# Patient Record
Sex: MALE | Race: OTHER | URBAN - METROPOLITAN AREA
[De-identification: names, ages, dates, MRNs, and addresses within clinical notes are randomized per-mention and may not be internally consistent; named-entity substitution may affect disease eponyms.]

---

## 2018-02-03 ENCOUNTER — INPATIENT (INPATIENT)
Facility: HOSPITAL | Age: 43
LOS: 5 days | Discharge: HOME | DRG: 897 | End: 2018-02-09
Attending: STUDENT IN AN ORGANIZED HEALTH CARE EDUCATION/TRAINING PROGRAM | Admitting: HOSPITALIST
Payer: MEDICAID

## 2018-02-03 VITALS
SYSTOLIC BLOOD PRESSURE: 177 MMHG | HEART RATE: 115 BPM | TEMPERATURE: 99 F | OXYGEN SATURATION: 96 % | RESPIRATION RATE: 18 BRPM | DIASTOLIC BLOOD PRESSURE: 132 MMHG

## 2018-02-03 LAB
ALBUMIN SERPL ELPH-MCNC: 4.6 G/DL — SIGNIFICANT CHANGE UP (ref 3.3–5)
ALP SERPL-CCNC: 74 U/L — SIGNIFICANT CHANGE UP (ref 40–120)
ALT FLD-CCNC: 48 U/L RC — HIGH (ref 10–45)
ANION GAP SERPL CALC-SCNC: 20 MMOL/L — HIGH (ref 5–17)
AST SERPL-CCNC: 49 U/L — HIGH (ref 10–40)
BILIRUB SERPL-MCNC: 0.7 MG/DL — SIGNIFICANT CHANGE UP (ref 0.2–1.2)
BUN SERPL-MCNC: 6 MG/DL — LOW (ref 7–23)
CALCIUM SERPL-MCNC: 9 MG/DL — SIGNIFICANT CHANGE UP (ref 8.4–10.5)
CHLORIDE SERPL-SCNC: 102 MMOL/L — SIGNIFICANT CHANGE UP (ref 96–108)
CO2 SERPL-SCNC: 25 MMOL/L — SIGNIFICANT CHANGE UP (ref 22–31)
CREAT SERPL-MCNC: 0.98 MG/DL — SIGNIFICANT CHANGE UP (ref 0.5–1.3)
EOSINOPHIL # BLD AUTO: 0 K/UL — SIGNIFICANT CHANGE UP (ref 0–0.5)
EOSINOPHIL NFR BLD AUTO: 0.6 % — SIGNIFICANT CHANGE UP (ref 0–6)
ETHANOL SERPL-MCNC: 370 MG/DL — HIGH (ref 0–10)
GLUCOSE SERPL-MCNC: 124 MG/DL — HIGH (ref 70–99)
HCT VFR BLD CALC: 45.7 % — SIGNIFICANT CHANGE UP (ref 39–50)
HGB BLD-MCNC: 16.1 G/DL — SIGNIFICANT CHANGE UP (ref 13–17)
LYMPHOCYTES # BLD AUTO: 2.6 K/UL — SIGNIFICANT CHANGE UP (ref 1–3.3)
LYMPHOCYTES # BLD AUTO: 61.5 % — HIGH (ref 13–44)
MAGNESIUM SERPL-MCNC: 2.2 MG/DL — SIGNIFICANT CHANGE UP (ref 1.6–2.6)
MCHC RBC-ENTMCNC: 34.2 PG — HIGH (ref 27–34)
MCHC RBC-ENTMCNC: 35.2 GM/DL — SIGNIFICANT CHANGE UP (ref 32–36)
MCV RBC AUTO: 97.2 FL — SIGNIFICANT CHANGE UP (ref 80–100)
MONOCYTES # BLD AUTO: 0.3 K/UL — SIGNIFICANT CHANGE UP (ref 0–0.9)
MONOCYTES NFR BLD AUTO: 7.9 % — SIGNIFICANT CHANGE UP (ref 2–14)
NEUTROPHILS # BLD AUTO: 1.1 K/UL — LOW (ref 1.8–7.4)
NEUTROPHILS NFR BLD AUTO: 26.1 % — LOW (ref 43–77)
PHOSPHATE SERPL-MCNC: 3.2 MG/DL — SIGNIFICANT CHANGE UP (ref 2.5–4.5)
PLAT MORPH BLD: NORMAL — SIGNIFICANT CHANGE UP
PLATELET # BLD AUTO: 272 K/UL — SIGNIFICANT CHANGE UP (ref 150–400)
POTASSIUM SERPL-MCNC: 3.9 MMOL/L — SIGNIFICANT CHANGE UP (ref 3.5–5.3)
POTASSIUM SERPL-SCNC: 3.9 MMOL/L — SIGNIFICANT CHANGE UP (ref 3.5–5.3)
PROT SERPL-MCNC: 7.6 G/DL — SIGNIFICANT CHANGE UP (ref 6–8.3)
RBC # BLD: 4.7 M/UL — SIGNIFICANT CHANGE UP (ref 4.2–5.8)
RBC # FLD: 14.1 % — SIGNIFICANT CHANGE UP (ref 10.3–14.5)
RBC BLD AUTO: NORMAL — SIGNIFICANT CHANGE UP
SODIUM SERPL-SCNC: 147 MMOL/L — HIGH (ref 135–145)
VARIANT LYMPHS # BLD: 4 % — SIGNIFICANT CHANGE UP (ref 0–6)
WBC # BLD: 4.3 K/UL — SIGNIFICANT CHANGE UP (ref 3.8–10.5)
WBC # FLD AUTO: 4.3 K/UL — SIGNIFICANT CHANGE UP (ref 3.8–10.5)

## 2018-02-03 PROCEDURE — 93010 ELECTROCARDIOGRAM REPORT: CPT

## 2018-02-03 PROCEDURE — 99285 EMERGENCY DEPT VISIT HI MDM: CPT | Mod: 25

## 2018-02-03 PROCEDURE — 70450 CT HEAD/BRAIN W/O DYE: CPT | Mod: 26

## 2018-02-03 RX ORDER — THIAMINE MONONITRATE (VIT B1) 100 MG
500 TABLET ORAL ONCE
Qty: 0 | Refills: 0 | Status: COMPLETED | OUTPATIENT
Start: 2018-02-03 | End: 2018-02-03

## 2018-02-03 RX ORDER — SODIUM CHLORIDE 9 MG/ML
1000 INJECTION INTRAMUSCULAR; INTRAVENOUS; SUBCUTANEOUS ONCE
Qty: 0 | Refills: 0 | Status: COMPLETED | OUTPATIENT
Start: 2018-02-03 | End: 2018-02-03

## 2018-02-03 RX ORDER — THIAMINE MONONITRATE (VIT B1) 100 MG
100 TABLET ORAL ONCE
Qty: 0 | Refills: 0 | Status: DISCONTINUED | OUTPATIENT
Start: 2018-02-03 | End: 2018-02-03

## 2018-02-03 RX ADMIN — SODIUM CHLORIDE 1000 MILLILITER(S): 9 INJECTION INTRAMUSCULAR; INTRAVENOUS; SUBCUTANEOUS at 23:48

## 2018-02-03 RX ADMIN — Medication 2 MILLIGRAM(S): at 22:20

## 2018-02-03 NOTE — ED PROVIDER NOTE - OBJECTIVE STATEMENT
Coming in for detox, wants to Coming in for detox, wants to stop using alcohol. Per brother has been acting unusually lately, may have fallen and bumped his head recently, is losing time and more confused than usual.  Patient has no complaints, no headache, neck pain, chest pain.  Had multiple episodes of vomiting today.  no fever/cough Coming in for detox, wants to stop using alcohol. Per brother has been acting unusually lately, may have fallen and bumped his head recently, is losing time and more confused than usual.  Patient has no complaints, no headache, neck pain, chest pain.  Had multiple episodes of vomiting today.  no fever/cough. hx DTs, no seizures

## 2018-02-03 NOTE — ED ADULT NURSE NOTE - OBJECTIVE STATEMENT
Presents requesting detox from alcohol. Pt states last drink was 4 shots of vodka this morning, Pt reports drinking an unspecified amount of vodka and beer daily. Pt states that he drinks "a lot." Unable to provide approximate amount of alcohol intake daily. Denies si/hi. On arrival to ED, pt anxious and forgetful. Reports that he fell into the wall today and hit his head. Denies LOC. Pt A&Ox3 with periods of forgetfulness. Denies cp/sob. Respirations even/unlabored. Abd soft. No n/v/d at present. Denies urinary symptoms. Skin WDI. Pt's brother at bedside and supportive with care.

## 2018-02-03 NOTE — ED PROVIDER NOTE - ATTENDING CONTRIBUTION TO CARE
pt is a 41 y/o male with h/o etoh abuse, dt, anxiety presents with request for detox brought in by his brother, found to have tremors, st 115, ivf, ativan, ciwa protocol, labs likely admisison.

## 2018-02-04 DIAGNOSIS — D70.9 NEUTROPENIA, UNSPECIFIED: ICD-10-CM

## 2018-02-04 DIAGNOSIS — R74.0 NONSPECIFIC ELEVATION OF LEVELS OF TRANSAMINASE AND LACTIC ACID DEHYDROGENASE [LDH]: ICD-10-CM

## 2018-02-04 DIAGNOSIS — E87.0 HYPEROSMOLALITY AND HYPERNATREMIA: ICD-10-CM

## 2018-02-04 DIAGNOSIS — M67.412 GANGLION, LEFT SHOULDER: Chronic | ICD-10-CM

## 2018-02-04 DIAGNOSIS — F32.9 MAJOR DEPRESSIVE DISORDER, SINGLE EPISODE, UNSPECIFIED: ICD-10-CM

## 2018-02-04 DIAGNOSIS — Z29.9 ENCOUNTER FOR PROPHYLACTIC MEASURES, UNSPECIFIED: ICD-10-CM

## 2018-02-04 DIAGNOSIS — F10.10 ALCOHOL ABUSE, UNCOMPLICATED: ICD-10-CM

## 2018-02-04 DIAGNOSIS — E87.2 ACIDOSIS: ICD-10-CM

## 2018-02-04 LAB
ALBUMIN SERPL ELPH-MCNC: 3.7 G/DL — SIGNIFICANT CHANGE UP (ref 3.3–5)
ALP SERPL-CCNC: 62 U/L — SIGNIFICANT CHANGE UP (ref 40–120)
ALT FLD-CCNC: 39 U/L — SIGNIFICANT CHANGE UP (ref 10–45)
ANION GAP SERPL CALC-SCNC: 15 MMOL/L — SIGNIFICANT CHANGE UP (ref 5–17)
APPEARANCE UR: CLEAR — SIGNIFICANT CHANGE UP
AST SERPL-CCNC: 36 U/L — SIGNIFICANT CHANGE UP (ref 10–40)
BILIRUB DIRECT SERPL-MCNC: 0.2 MG/DL — SIGNIFICANT CHANGE UP (ref 0–0.2)
BILIRUB INDIRECT FLD-MCNC: 0.6 MG/DL — SIGNIFICANT CHANGE UP (ref 0.2–1)
BILIRUB SERPL-MCNC: 0.8 MG/DL — SIGNIFICANT CHANGE UP (ref 0.2–1.2)
BILIRUB UR-MCNC: NEGATIVE — SIGNIFICANT CHANGE UP
BUN SERPL-MCNC: 7 MG/DL — SIGNIFICANT CHANGE UP (ref 7–23)
CALCIUM SERPL-MCNC: 8.2 MG/DL — LOW (ref 8.4–10.5)
CHLORIDE SERPL-SCNC: 105 MMOL/L — SIGNIFICANT CHANGE UP (ref 96–108)
CO2 SERPL-SCNC: 23 MMOL/L — SIGNIFICANT CHANGE UP (ref 22–31)
COLOR SPEC: YELLOW — SIGNIFICANT CHANGE UP
CREAT SERPL-MCNC: 0.8 MG/DL — SIGNIFICANT CHANGE UP (ref 0.5–1.3)
DIFF PNL FLD: NEGATIVE — SIGNIFICANT CHANGE UP
GLUCOSE SERPL-MCNC: 104 MG/DL — HIGH (ref 70–99)
GLUCOSE UR QL: NEGATIVE MG/DL — SIGNIFICANT CHANGE UP
KETONES UR-MCNC: NEGATIVE — SIGNIFICANT CHANGE UP
LEUKOCYTE ESTERASE UR-ACNC: NEGATIVE — SIGNIFICANT CHANGE UP
MAGNESIUM SERPL-MCNC: 1.9 MG/DL — SIGNIFICANT CHANGE UP (ref 1.6–2.6)
NITRITE UR-MCNC: NEGATIVE — SIGNIFICANT CHANGE UP
PH UR: 8 — SIGNIFICANT CHANGE UP (ref 5–8)
PHOSPHATE SERPL-MCNC: 3.9 MG/DL — SIGNIFICANT CHANGE UP (ref 2.5–4.5)
POTASSIUM SERPL-MCNC: 3.8 MMOL/L — SIGNIFICANT CHANGE UP (ref 3.5–5.3)
POTASSIUM SERPL-SCNC: 3.8 MMOL/L — SIGNIFICANT CHANGE UP (ref 3.5–5.3)
PROT SERPL-MCNC: 6.1 G/DL — SIGNIFICANT CHANGE UP (ref 6–8.3)
PROT UR-MCNC: NEGATIVE MG/DL — SIGNIFICANT CHANGE UP
SODIUM SERPL-SCNC: 143 MMOL/L — SIGNIFICANT CHANGE UP (ref 135–145)
SP GR SPEC: 1.01 — SIGNIFICANT CHANGE UP (ref 1.01–1.02)
UROBILINOGEN FLD QL: 1 MG/DL — SIGNIFICANT CHANGE UP

## 2018-02-04 PROCEDURE — 99223 1ST HOSP IP/OBS HIGH 75: CPT | Mod: GC

## 2018-02-04 PROCEDURE — 12345: CPT | Mod: GC,NC

## 2018-02-04 PROCEDURE — 71045 X-RAY EXAM CHEST 1 VIEW: CPT | Mod: 26

## 2018-02-04 RX ORDER — LOSARTAN POTASSIUM 100 MG/1
100 TABLET, FILM COATED ORAL DAILY
Qty: 0 | Refills: 0 | Status: DISCONTINUED | OUTPATIENT
Start: 2018-02-04 | End: 2018-02-09

## 2018-02-04 RX ORDER — FOLIC ACID 0.8 MG
1 TABLET ORAL DAILY
Qty: 0 | Refills: 0 | Status: DISCONTINUED | OUTPATIENT
Start: 2018-02-04 | End: 2018-02-09

## 2018-02-04 RX ORDER — INFLUENZA VIRUS VACCINE 15; 15; 15; 15 UG/.5ML; UG/.5ML; UG/.5ML; UG/.5ML
0.5 SUSPENSION INTRAMUSCULAR ONCE
Qty: 0 | Refills: 0 | Status: COMPLETED | OUTPATIENT
Start: 2018-02-04 | End: 2018-02-09

## 2018-02-04 RX ORDER — ACETAMINOPHEN 500 MG
650 TABLET ORAL ONCE
Qty: 0 | Refills: 0 | Status: COMPLETED | OUTPATIENT
Start: 2018-02-04 | End: 2018-02-04

## 2018-02-04 RX ORDER — ALBUTEROL 90 UG/1
2 AEROSOL, METERED ORAL EVERY 6 HOURS
Qty: 0 | Refills: 0 | Status: DISCONTINUED | OUTPATIENT
Start: 2018-02-04 | End: 2018-02-09

## 2018-02-04 RX ORDER — HYDRALAZINE HCL 50 MG
25 TABLET ORAL ONCE
Qty: 0 | Refills: 0 | Status: COMPLETED | OUTPATIENT
Start: 2018-02-04 | End: 2018-02-04

## 2018-02-04 RX ORDER — THIAMINE MONONITRATE (VIT B1) 100 MG
100 TABLET ORAL DAILY
Qty: 0 | Refills: 0 | Status: COMPLETED | OUTPATIENT
Start: 2018-02-04 | End: 2018-02-06

## 2018-02-04 RX ORDER — HYDRALAZINE HCL 50 MG
10 TABLET ORAL ONCE
Qty: 0 | Refills: 0 | Status: COMPLETED | OUTPATIENT
Start: 2018-02-04 | End: 2018-02-04

## 2018-02-04 RX ORDER — LABETALOL HCL 100 MG
10 TABLET ORAL ONCE
Qty: 0 | Refills: 0 | Status: COMPLETED | OUTPATIENT
Start: 2018-02-04 | End: 2018-02-04

## 2018-02-04 RX ORDER — ASPIRIN/CALCIUM CARB/MAGNESIUM 324 MG
325 TABLET ORAL DAILY
Qty: 0 | Refills: 0 | Status: DISCONTINUED | OUTPATIENT
Start: 2018-02-04 | End: 2018-02-09

## 2018-02-04 RX ORDER — VENLAFAXINE HCL 75 MG
150 CAPSULE, EXT RELEASE 24 HR ORAL DAILY
Qty: 0 | Refills: 0 | Status: DISCONTINUED | OUTPATIENT
Start: 2018-02-04 | End: 2018-02-09

## 2018-02-04 RX ORDER — ACETAMINOPHEN 500 MG
650 TABLET ORAL EVERY 6 HOURS
Qty: 0 | Refills: 0 | Status: DISCONTINUED | OUTPATIENT
Start: 2018-02-04 | End: 2018-02-09

## 2018-02-04 RX ORDER — HEPARIN SODIUM 5000 [USP'U]/ML
5000 INJECTION INTRAVENOUS; SUBCUTANEOUS EVERY 8 HOURS
Qty: 0 | Refills: 0 | Status: DISCONTINUED | OUTPATIENT
Start: 2018-02-04 | End: 2018-02-06

## 2018-02-04 RX ADMIN — Medication 75 MILLIGRAM(S): at 23:31

## 2018-02-04 RX ADMIN — Medication 1 MILLIGRAM(S): at 11:33

## 2018-02-04 RX ADMIN — Medication 75 MILLIGRAM(S): at 05:02

## 2018-02-04 RX ADMIN — Medication 25 MILLIGRAM(S): at 21:17

## 2018-02-04 RX ADMIN — Medication 75 MILLIGRAM(S): at 17:56

## 2018-02-04 RX ADMIN — HEPARIN SODIUM 5000 UNIT(S): 5000 INJECTION INTRAVENOUS; SUBCUTANEOUS at 14:10

## 2018-02-04 RX ADMIN — LOSARTAN POTASSIUM 100 MILLIGRAM(S): 100 TABLET, FILM COATED ORAL at 11:33

## 2018-02-04 RX ADMIN — Medication 150 MILLIGRAM(S): at 21:17

## 2018-02-04 RX ADMIN — Medication 2 MILLIGRAM(S): at 04:52

## 2018-02-04 RX ADMIN — Medication 75 MILLIGRAM(S): at 11:33

## 2018-02-04 RX ADMIN — HEPARIN SODIUM 5000 UNIT(S): 5000 INJECTION INTRAVENOUS; SUBCUTANEOUS at 07:00

## 2018-02-04 RX ADMIN — HEPARIN SODIUM 5000 UNIT(S): 5000 INJECTION INTRAVENOUS; SUBCUTANEOUS at 21:17

## 2018-02-04 RX ADMIN — Medication 10 MILLIGRAM(S): at 21:52

## 2018-02-04 RX ADMIN — Medication 100 MILLIGRAM(S): at 11:36

## 2018-02-04 RX ADMIN — Medication 10 MILLIGRAM(S): at 19:22

## 2018-02-04 RX ADMIN — Medication 650 MILLIGRAM(S): at 14:10

## 2018-02-04 RX ADMIN — Medication 105 MILLIGRAM(S): at 00:44

## 2018-02-04 RX ADMIN — Medication 650 MILLIGRAM(S): at 14:40

## 2018-02-04 NOTE — H&P ADULT - PROBLEM SELECTOR PLAN 7
- improve score at 0, but will be more stationary when going through ciwa, will start hsq, encourage ambulation once out of acute phase. - improve score at 0, but will be more stationary when going through ciwa, will start hsq, encourage ambulation once out of acute phase.  See attending note below--IMPROVE data difficult to abstract for this patient--patient agrees to pharmacologic DVT prophylaxis.

## 2018-02-04 NOTE — H&P ADULT - PROBLEM SELECTOR PLAN 4
- incr'd anion gap is c/w EtOH abuse.   - trend GAP and BMP. - likely 2/2 EtOH use, trend AST and ALT, if rising could consider checking RUQ US  - denies IVDA use

## 2018-02-04 NOTE — H&P ADULT - NSHPREVIEWOFSYSTEMS_GEN_ALL_CORE
General: Denies fever, chills, weight loss  HEENT: Denies vision changes  Cardio: Denies CP, palpitations  Pulm: Denies SOB, wheezing, cough,   GI: Denies dark stools (+) vomiting.   Neuro: Denies focal weakness, decreased/altered sensation  Musc Skel: Denies back pain, joint pain  Uro: Denies dysuria  Skin: Denies recent rashes  Endocrine: Denies polyuria, polydipsia  Psych: Denies anxiety and depressed mood

## 2018-02-04 NOTE — H&P ADULT - ATTENDING COMMENTS
NIGHT HOSPITALIST:  Patient UNKNOWN to me previously, irregularly followed by a physician in [facility]--patient with a history of daily heavy ethanol use with patient admitting to 6 drinks of liquor with beer chasers since his teens--reported asthma, with patient referred by his brother and patient's estranged spouse (family not in attendance and currently could not be reached) with patient requesting detoxification referral.  Patient admits to prior ethanol withdrawal.  Patient with prior visual hallucinations in the past.  Patient reportedly sustained a fall into a wall during intoxication at home.  No HA, no focal weakness.  NO visual hallucinations at present.  No chest pain/pressure.  No dyspnea.  No abdominal pain, no red blood per rectum or melena.  No back pain, no tearing back pain.  No fever, no chills, no rigors.  NO suicidal or homicidal ideation.  No weight loss or anorexia.  No cough, no wheezing.  Remaining review of systems not contributory.   No reported tobacco or street drug use.  Reportedly works as a  in advertising. NIGHT HOSPITALIST:  Patient UNKNOWN to me previously, irregularly followed by a physician in [facility]--patient with a history of daily heavy ethanol use with patient admitting to 6 drinks of liquor with beer chasers since his teens--reported asthma, with patient referred by his brother and patient's estranged spouse (family not in attendance and currently could not be reached) with patient requesting detoxification referral.  Patient admits to prior ethanol withdrawal.  Patient with prior visual hallucinations in the past.  Patient reportedly sustained a fall into a wall during intoxication at home.  No HA, no focal weakness.  NO visual hallucinations at present.  No chest pain/pressure.  No dyspnea.  No abdominal pain, no red blood per rectum or melena.  No back pain, no tearing back pain.  No fever, no chills, no rigors.  NO suicidal or homicidal ideation.  No weight loss or anorexia.  No cough, no wheezing.  Remaining review of systems not contributory.   No reported tobacco or street drug use.  Reportedly works as a  in advertising.  Physical exam with a middle aged, chronically ill appearing disheveled M appears older than stated age, RIGHT upper extremity tattoos.  BP  135/92  earlier hypertensive at 177/132.  Afebrile.  HR  .  RR 14.  99% on RA.  HEENT, PERRL, EOMI, no Perez's, no raccoon eye.  Neck supple, chest clear, cor s1 s2 tachycardia.  abdomen soft normal bowel sounds, nontender, no rebound.  Ext with mild fine tremor.  Mild sarcopenia.  Neurologic exam AxOx3.  Speech fluent.  Cognition intact.  Poor insight.  No suicidal or homicidal ideation.  UE/LE 5/5.  WBC 4.3.  Hgb 16.1.  Platelets of 272K.  K+ 3.9.  Na+ 147.  Random glucose of 124.  Cr 0.9.  Alb 4.6.  Mg++ 2.2  Phos 3.2.  ethanol level at 2300 of 370.  AST 49  ALT  48.  CTT head negative.  EKG tracing reviewed with NSR at 78.  NO coagulation profile ordered by the ER. NIGHT HOSPITALIST:  Patient UNKNOWN to me previously, irregularly followed by a physician in [facility]--patient with a history of daily heavy ethanol use with patient admitting to 6 drinks of liquor with beer chasers since his teens--reported asthma, with patient referred by his brother and patient's estranged spouse (family not in attendance and currently could not be reached) with patient requesting detoxification referral.  Patient admits to prior ethanol withdrawal.  Patient with prior visual hallucinations in the past.  Patient reportedly sustained a fall into a wall during intoxication at home.  No HA, no focal weakness.  NO visual hallucinations at present.  No chest pain/pressure.  No dyspnea.  No abdominal pain, no red blood per rectum or melena.  No back pain, no tearing back pain.  No fever, no chills, no rigors.  NO suicidal or homicidal ideation.  No weight loss or anorexia.  No cough, no wheezing.  Remaining review of systems not contributory.   No reported tobacco or street drug use.  Reportedly works as a  in advertising.  Physical exam with a middle aged, chronically ill appearing disheveled M appears older than stated age, RIGHT upper extremity tattoos.  BP  135/92  earlier hypertensive at 177/132>>repeat BP of 158/95.  Afebrile.  HR  .  RR 14.  99% on RA.  HEENT, PERRL, EOMI, no Perez's, no raccoon eye.  Neck supple, chest clear, cor s1 s2 tachycardia.  abdomen soft normal bowel sounds, nontender, no rebound.  Ext with mild fine tremor.  Mild sarcopenia.  Neurologic exam AxOx3.  Speech fluent.  Cognition intact.  Poor insight.  No suicidal or homicidal ideation.  UE/LE 5/5.  WBC 4.3.  Hgb 16.1.  Platelets of 272K.  K+ 3.9.  Na+ 147.  Random glucose of 124.  Cr 0.9.  Alb 4.6.  Mg++ 2.2  Phos 3.2.  ethanol level at 2300 of 370.  AST 49  ALT  48.  CTT head negative.  EKG tracing reviewed with NSR at 78.  NO coagulation profile ordered by the ER.  Chest radiograph ordered.  Admitted to medicine.  No clear indication for telemetry.  social work.  Agrees to psychiatry evaluation>>would review patient's Effexor.  Patient at high risk CIWA protocol>>hypertension likely related to withdrawal.  Would favor Librium taper at 75 to 100 mg with Ativan for breakthrough.  Patient verbalizes he wishes detox but patient with poor insight into the nature of his ethanol dependency.  Given history of recidivism, patient's long term prognosis is guarded.  Emotional support provided to patient.  Care reviewed with Dr. Morataya.  Care assumed by the DAY HOSPITALIST.    Lior Bass MD  [facility]

## 2018-02-04 NOTE — H&P ADULT - PROBLEM SELECTOR PLAN 2
- ANC at 1100, likely 2/2 BM suppressive effects of EtOH  - cont to monitor - denied SI, takes venlafaxine at home, will hold for now, plan for psych consult in AM  - enhanced supervision to prevent elopement.

## 2018-02-04 NOTE — H&P ADULT - NSHPLABSRESULTS_GEN_ALL_CORE
16.1   4.3   )-----------( 272      ( 03 Feb 2018 22:43 )             45.7       02-03    147<H>  |  102  |  6<L>  ----------------------------<  124<H>  3.9   |  25  |  0.98    Ca    9.0      03 Feb 2018 22:43  Phos  3.2     02-03  Mg     2.2     02-03    TPro  7.6  /  Alb  4.6  /  TBili  0.7  /  DBili  x   /  AST  49<H>  /  ALT  48<H>  /  AlkPhos  74  02-03      EKG:   CXR:

## 2018-02-04 NOTE — PROGRESS NOTE ADULT - PROBLEM SELECTOR PLAN 5
- incr'd anion gap is c/w EtOH abuse.   - trend GAP and BMP. - incr'd anion gap is c/w EtOH abuse.   - trend GAP and BMP - normalized 2/4

## 2018-02-04 NOTE — PROGRESS NOTE ADULT - SUBJECTIVE AND OBJECTIVE BOX
Patient is a 42y old  Male who presents with a chief complaint of My brother brought me to hospital for detox (2018 08:10)      SUBJECTIVE / OVERNIGHT EVENTS:  admitted overnight - pt presents for EtOH withdrawal  sleeping comfortably, pleasant and relaxed when woken  would like to see psychiatry and social work during admission, does not have a specific plan for support to stay off alcohol after discharge      MEDICATIONS  (STANDING):  chlordiazePOXIDE   Oral   chlordiazePOXIDE 75 milliGRAM(s) Oral every 6 hours  folic acid 1 milliGRAM(s) Oral daily  heparin  Injectable 5000 Unit(s) SubCutaneous every 8 hours  influenza   Vaccine 0.5 milliLiter(s) IntraMuscular once  thiamine 100 milliGRAM(s) Oral daily    MEDICATIONS  (PRN):  ALBUTerol    90 MICROgram(s) HFA Inhaler 2 Puff(s) Inhalation every 6 hours PRN Wheezing  LORazepam     Tablet 2 milliGRAM(s) Oral every 2 hours PRN CIWA-Ar score increase by 2 points and a total score of 7 or less  LORazepam   Injectable 2 milliGRAM(s) IV Push every 1 hour PRN CIWA-Ar score 8 or greater    Home meds:   venlafaxine er 150  losartan 100  albuterol prn    T(C): 37.3 (18 @ 09:40), Max: 37.3 (18 @ 07:42)  HR: 86 (18 @ 09:40) (76 - 115)  BP: 142/83 (18 @ 09:40) (135/92 - 177/132)  RR: 18 (18 @ 09:40) (16 - 18)  SpO2: 98% (18 @ 09:40) (96% - 100%)  CAPILLARY BLOOD GLUCOSE        I&O's Summary      PHYSICAL EXAM:  	General: NAD, well-developed  	Eyes: EOMI - non sustained lateral nystagmus  	Neck: Supple, No JVD  	Chest/Lung: Clear to auscultation bilaterally; No wheezes  	Heart: Regular rate and rhythm; No murmurs, rubs, or gallops.  	Abdom: Soft, Nontender, Nondistended; Bowel sounds present  	Extremities: No lower extremity edema.   	Psych: AAOx3  	Neurology: non-focal, strength and sensation grossly intact UE and LE BL.  Skin: No rashes or lesions    LABS:                        16.1   4.3   )-----------( 272      ( 2018 22:43 )             45.7     02-04    143  |  105  |  7   ----------------------------<  104<H>  3.8   |  23  |  0.80    Ca    8.2<L>      2018 08:28  Phos  3.9     02-  Mg     1.9     -04    TPro  6.1  /  Alb  3.7  /  TBili  0.8  /  DBili  0.2  /  AST  36  /  ALT  39  /  AlkPhos  62  02-04          Urinalysis Basic - ( 2018 09:01 )    Color: Yellow / Appearance: Clear / S.015 / pH: x  Gluc: x / Ketone: Negative  / Bili: Negative / Urobili: 1.0 mg/dL   Blood: x / Protein: Negative mg/dL / Nitrite: Negative   Leuk Esterase: Negative / RBC: x / WBC x   Sq Epi: x / Non Sq Epi: x / Bacteria: x        RADIOLOGY & ADDITIONAL TESTS:    Imaging Personally Reviewed:  Consultant(s) Notes Reviewed:    Care Discussed with Consultants/Other Providers:

## 2018-02-04 NOTE — H&P ADULT - NSHPSOCIALHISTORY_GEN_ALL_CORE
Lives at home alone,  from wife.   EtOH history: 1 pint vodka per day for 30 years  Tobacco History: Denies  Employment: Advertising, [employer],

## 2018-02-04 NOTE — H&P ADULT - ASSESSMENT
42M PMHx EtOH abuse w/ hx of DTs in recent weeks, presenting for detox, last drink Sat 8am, suffered fall with head trauma at home with LoC, CT head (-).

## 2018-02-04 NOTE — H&P ADULT - PROBLEM SELECTOR PLAN 6
- improve score at 0, but will be more stationary when going through ciwa, will start hsq, encourage ambulation once out of acute phase. - mild, likely dehydration related, check AM BMP

## 2018-02-04 NOTE — H&P ADULT - NSHPPHYSICALEXAM_GEN_ALL_CORE
Vital Signs Last 24 Hrs  T(C): 36.4 (04 Feb 2018 03:15), Max: 37.1 (03 Feb 2018 20:21)  T(F): 97.6 (04 Feb 2018 03:15), Max: 98.7 (03 Feb 2018 20:21)  HR: 84 (04 Feb 2018 03:15) (81 - 115)  BP: 135/92 (04 Feb 2018 03:15) (135/92 - 177/132)  BP(mean): --  RR: 16 (04 Feb 2018 03:15) (16 - 18)  SpO2: 97% (04 Feb 2018 03:15) (96% - 100%)    PHYSICAL EXAM:  General: NAD, well-developed  Eyes: EOMI  Neck: Supple, No JVD  Chest/Lung: Clear to auscultation bilaterally; No wheezes  Heart: Regular rate and rhythm; No murmurs, rubs, or gallops. Capillary refill WNL  Abdom: Soft, Nontender, Nondistended; Bowel sounds present  Extremities: No lower extremity edema.   Psych: AAOx3  Neurology: non-focal, strength and sensation grossly intact UE and LE BL. No spinal TTP  Skin: No rashes or lesions

## 2018-02-04 NOTE — H&P ADULT - PROBLEM SELECTOR PLAN 5
- mild, likely dehydration related, check AM BMP - incr'd anion gap is c/w EtOH abuse.   - trend GAP and BMP.

## 2018-02-04 NOTE — PROGRESS NOTE ADULT - PROBLEM SELECTOR PLAN 4
- likely 2/2 EtOH use, trend AST and ALT, if rising could consider checking RUQ US  - denies IVDA use - likely 2/2 EtOH use, trend AST and ALT, resolved today

## 2018-02-04 NOTE — PROGRESS NOTE ADULT - PROBLEM SELECTOR PLAN 1
- started CIWA w/ librium taper w/ ativan for breakthru w/d symptoms, last drink Sat 8am, monitor closely, no hx of withdrawal seizures but notes hallucinates assoc w/ shakes when not drinking in the past which likely represents DTs  - discussed considering naltrexone as outpt.  - social work consult going forward. - started CIWA w/ librium taper w/ ativan for breakthru w/d symptoms, last drink Sat 8am, monitor closely, no hx of withdrawal seizures but notes hallucinates assoc w/ shakes when not drinking in the past which likely represents DTs  - discussed considering naltrexone as outpt.  - social work consult going forward.  -Monitor for electrolyte derangements

## 2018-02-04 NOTE — H&P ADULT - HISTORY OF PRESENT ILLNESS
42M PMHx EtOH abuse presenting for detox. Pt states he wants to put alcoholism behind him, is tired of the mental and physical anguish that he associates with his EtOH. Drinks vodka, about 6 shots per day, last drink Sat 8am, previous withdrawals in past weeks associated visual hallucinations, gets morning shakes if without a drink, never had withdrawal seizures C/o mild belly pain, also has headache, fell into his wall from stairs at his home. Denies fevers, chills, CP, SOB, palpitations, dark stools, dizziness, vision changes, recent changes to medications, recent sick contacts/illness/travel.     ED course  - Tm 98.7, HR , /133 ---> 135/92, RR 17, 97% RA  - low ANC at 1.1, hyperNa at 147, mild transaminitis, EtOH at 370  - CT w/o acute pathology. 42M PMHx EtOH abuse presenting for detox. Pt states he wants to put alcoholism behind him, is tired of the mental and physical anguish that he associates with his EtOH. Drinks vodka, about 6 shots per day, last drink Sat 8am, previous withdrawals in past weeks associated visual hallucinations, gets morning shakes if without a drink, never had withdrawal seizures C/o mild belly pain, also has headache, fell into his wall from stairs at his home. Denies fevers, chills, CP, SOB, palpitations, dark stools, dizziness, vision changes, recent changes to medications, recent sick contacts/illness/travel.     ED course  - Tm 98.7, HR , /133 ---> 135/92, RR 17, 97% RA  - low ANC at 1.1, hyperNa at 147, mild transaminitis, EtOH at 370  - CT w/o acute pathology.     PCP Medhat Fitzgerald

## 2018-02-04 NOTE — H&P ADULT - PROBLEM SELECTOR PLAN 3
- likely 2/2 EtOH use, trend AST and ALT, if rising could consider checking RUQ US  - denies IVDA use - ANC at 1100, likely 2/2 BM suppressive effects of EtOH  - cont to monitor

## 2018-02-04 NOTE — H&P ADULT - PROBLEM SELECTOR PLAN 1
- started CIWA w/ standing ativan 2mg per protocol, last drink Sat 8am, monitor closely, no hx of withdrawal seizures but notes hallucinates assoc w/ shakes in recent weeks.  - discussed considering naltrexone as outpt.  - social work consult going forward. - started CIWA w/ librium taper w/ ativan for breakthru w/d symptoms, last drink Sat 8am, monitor closely, no hx of withdrawal seizures but notes hallucinates assoc w/ shakes in recent weeks which likely represents DTs  - discussed considering naltrexone as outpt.  - social work consult going forward.

## 2018-02-05 ENCOUNTER — TRANSCRIPTION ENCOUNTER (OUTPATIENT)
Age: 43
End: 2018-02-05

## 2018-02-05 LAB
ANION GAP SERPL CALC-SCNC: 16 MMOL/L — SIGNIFICANT CHANGE UP (ref 5–17)
BUN SERPL-MCNC: 11 MG/DL — SIGNIFICANT CHANGE UP (ref 7–23)
CALCIUM SERPL-MCNC: 8.6 MG/DL — SIGNIFICANT CHANGE UP (ref 8.4–10.5)
CHLORIDE SERPL-SCNC: 102 MMOL/L — SIGNIFICANT CHANGE UP (ref 96–108)
CO2 SERPL-SCNC: 22 MMOL/L — SIGNIFICANT CHANGE UP (ref 22–31)
CREAT SERPL-MCNC: 0.8 MG/DL — SIGNIFICANT CHANGE UP (ref 0.5–1.3)
GLUCOSE SERPL-MCNC: 99 MG/DL — SIGNIFICANT CHANGE UP (ref 70–99)
MAGNESIUM SERPL-MCNC: 1.7 MG/DL — SIGNIFICANT CHANGE UP (ref 1.6–2.6)
PHOSPHATE SERPL-MCNC: 3.8 MG/DL — SIGNIFICANT CHANGE UP (ref 2.5–4.5)
POTASSIUM SERPL-MCNC: 3 MMOL/L — LOW (ref 3.5–5.3)
POTASSIUM SERPL-SCNC: 3 MMOL/L — LOW (ref 3.5–5.3)
SODIUM SERPL-SCNC: 140 MMOL/L — SIGNIFICANT CHANGE UP (ref 135–145)

## 2018-02-05 PROCEDURE — 99233 SBSQ HOSP IP/OBS HIGH 50: CPT | Mod: GC

## 2018-02-05 RX ORDER — VENLAFAXINE HCL 75 MG
0 CAPSULE, EXT RELEASE 24 HR ORAL
Qty: 0 | Refills: 0 | COMMUNITY

## 2018-02-05 RX ORDER — LOSARTAN POTASSIUM 100 MG/1
1 TABLET, FILM COATED ORAL
Qty: 0 | Refills: 0 | COMMUNITY

## 2018-02-05 RX ORDER — HYDRALAZINE HCL 50 MG
10 TABLET ORAL ONCE
Qty: 0 | Refills: 0 | Status: COMPLETED | OUTPATIENT
Start: 2018-02-05 | End: 2018-02-05

## 2018-02-05 RX ORDER — POTASSIUM CHLORIDE 20 MEQ
10 PACKET (EA) ORAL
Qty: 0 | Refills: 0 | Status: COMPLETED | OUTPATIENT
Start: 2018-02-05 | End: 2018-02-05

## 2018-02-05 RX ORDER — HYDRALAZINE HCL 50 MG
25 TABLET ORAL THREE TIMES A DAY
Qty: 0 | Refills: 0 | Status: DISCONTINUED | OUTPATIENT
Start: 2018-02-05 | End: 2018-02-06

## 2018-02-05 RX ORDER — POTASSIUM CHLORIDE 20 MEQ
40 PACKET (EA) ORAL EVERY 4 HOURS
Qty: 0 | Refills: 0 | Status: COMPLETED | OUTPATIENT
Start: 2018-02-05 | End: 2018-02-05

## 2018-02-05 RX ORDER — LABETALOL HCL 100 MG
10 TABLET ORAL ONCE
Qty: 0 | Refills: 0 | Status: COMPLETED | OUTPATIENT
Start: 2018-02-05 | End: 2018-02-05

## 2018-02-05 RX ORDER — POTASSIUM CHLORIDE 20 MEQ
20 PACKET (EA) ORAL
Qty: 0 | Refills: 0 | Status: DISCONTINUED | OUTPATIENT
Start: 2018-02-05 | End: 2018-02-05

## 2018-02-05 RX ORDER — ALBUTEROL 90 UG/1
2 AEROSOL, METERED ORAL
Qty: 0 | Refills: 0 | COMMUNITY

## 2018-02-05 RX ORDER — VENLAFAXINE HCL 75 MG
150 CAPSULE, EXT RELEASE 24 HR ORAL
Qty: 0 | Refills: 0 | COMMUNITY

## 2018-02-05 RX ADMIN — Medication 10 MILLIGRAM(S): at 18:40

## 2018-02-05 RX ADMIN — Medication 25 MILLIGRAM(S): at 16:05

## 2018-02-05 RX ADMIN — Medication 40 MILLIEQUIVALENT(S): at 13:22

## 2018-02-05 RX ADMIN — LOSARTAN POTASSIUM 100 MILLIGRAM(S): 100 TABLET, FILM COATED ORAL at 06:00

## 2018-02-05 RX ADMIN — Medication 25 MILLIGRAM(S): at 22:32

## 2018-02-05 RX ADMIN — Medication 100 MILLIEQUIVALENT(S): at 08:50

## 2018-02-05 RX ADMIN — HEPARIN SODIUM 5000 UNIT(S): 5000 INJECTION INTRAVENOUS; SUBCUTANEOUS at 13:23

## 2018-02-05 RX ADMIN — Medication 100 MILLIEQUIVALENT(S): at 11:47

## 2018-02-05 RX ADMIN — Medication 40 MILLIEQUIVALENT(S): at 16:59

## 2018-02-05 RX ADMIN — HEPARIN SODIUM 5000 UNIT(S): 5000 INJECTION INTRAVENOUS; SUBCUTANEOUS at 06:00

## 2018-02-05 RX ADMIN — Medication 100 MILLIEQUIVALENT(S): at 13:21

## 2018-02-05 RX ADMIN — Medication 10 MILLIGRAM(S): at 15:06

## 2018-02-05 RX ADMIN — Medication 40 MILLIEQUIVALENT(S): at 09:00

## 2018-02-05 RX ADMIN — Medication 100 MILLIGRAM(S): at 11:49

## 2018-02-05 RX ADMIN — Medication 150 MILLIGRAM(S): at 11:49

## 2018-02-05 RX ADMIN — Medication 1 MILLIGRAM(S): at 11:49

## 2018-02-05 RX ADMIN — Medication 75 MILLIGRAM(S): at 16:59

## 2018-02-05 RX ADMIN — Medication 75 MILLIGRAM(S): at 09:11

## 2018-02-05 RX ADMIN — HEPARIN SODIUM 5000 UNIT(S): 5000 INJECTION INTRAVENOUS; SUBCUTANEOUS at 22:32

## 2018-02-05 RX ADMIN — Medication 2 MILLIGRAM(S): at 19:12

## 2018-02-05 NOTE — DISCHARGE NOTE ADULT - HOSPITAL COURSE
This is a 42M with PMHx HTN, Depression and EToh Abuse with previous history of DTs who presented to ED for Detox. Patient also admitted to fall at home with head trauma and LOC. In ED, patient afebrile, hypertensive and tachycardic. CIWA-5. CT Head negative. Labs showed mild transaminitis that resolved following IV fluids, low ANC and hypernatremia with a blood alcohol level of 370. Patient was admitted for Etoh detox and started on a high-risk Librium taper with PRN ativan for breakthrough symptoms. Patient's blood pressure remained elevated and was started on hydralazine, up-titrated to 50mg TID for adequate control. Patient otherwise remained hemodynamically stable throughout his hospital course and successfully completed a 6 day Librium Taper with resolution of his symptoms. During this time, patient was evaluated by social work, PT and psychiatry. Psychiatry evaluated patient for his depression and recommended beginning Remeron 7.5mg PO qHS in addition to his Effexor... This is a 42M with PMHx HTN, Depression and EToh Abuse with previous history of DTs who presented to ED for Detox. Patient also admitted to fall at home with head trauma and LOC. In ED, patient afebrile, hypertensive and tachycardic. CIWA-5. CT Head negative. Labs showed mild transaminitis that resolved following IV fluids, low ANC and hypernatremia with a blood alcohol level of 370. Patient was admitted for Etoh detox and started on a high-risk Librium taper with PRN ativan for breakthrough symptoms. Patient's blood pressure remained elevated and was started on hydralazine, up-titrated to 50mg TID for adequate control. Patient otherwise remained hemodynamically stable throughout his hospital course and successfully completed a 6 day Librium Taper with resolution of his symptoms. During this time, patient was evaluated by social work, PT and psychiatry. Psychiatry evaluated patient for his depression and recommended beginning Remeron 7.5mg PO qHS in addition to his Effexor. Patient was evaluated by PT and considered safe for return to home. Patient remained hemodynamically stable, with CIWA scores of 0 upon completion of Librium Taper, and considered safe for discharge home with instructions to follow up with PCP and Psychiatry/Addiction Clinic. This is a 42M with PMHx HTN, Depression and EToh Abuse with previous history of DTs who presented to ED for Detox. Patient also admitted to fall at home with head trauma and LOC. In ED, patient afebrile, hypertensive and tachycardic. CIWA-5. CT Head negative. Labs showed mild transaminitis that resolved following IV fluids, low ANC and hypernatremia with a blood alcohol level of 370. Patient was admitted for Etoh detox and started on a high-risk Librium taper with PRN ativan for breakthrough symptoms. Patient's blood pressure remained elevated and was started on hydralazine, up-titrated to 50mg TID for adequate control. Patient otherwise remained hemodynamically stable throughout his hospital course and successfully completed a 6 day Librium Taper with resolution of his symptoms. During this time, patient was evaluated by social work, PT and psychiatry. Psychiatry evaluated patient for his depression and recommended beginning Remeron 7.5mg PO qHS in addition to his Effexor. Patient was evaluated by PT and considered safe for return to home. Patient remained hemodynamically stable, with CIWA scores of 0 upon completion of Librium Taper, and considered safe for discharge home with instructions to continue taking Hydaralazine 25mg PO TID and Remeron 7.5 qHS in addition to his home meds, follow up with his PCP (Either Dr. Shekhar Fitzgerald his PCP in [place] or at the clinic here, contact information provided) and Psychiatry/Addiction Clinic.

## 2018-02-05 NOTE — DISCHARGE NOTE ADULT - PATIENT PORTAL LINK FT
You can access the [facility] Portal, offered by [facility], by registering with the following website: [email or web address]

## 2018-02-05 NOTE — PROGRESS NOTE ADULT - SUBJECTIVE AND OBJECTIVE BOX
Patient is a 42y old  Male who presents with a chief complaint of My brother brought me to hospital for detox (2018 08:10)      OVERNIGHT EVENTS: No acute events overnight.  SUBJECTIVE: Patient seen and examined at bedside. Denies CP, SOB, abdominal pain/N/V, F/C/NS.       MEDICATIONS  (STANDING):  chlordiazePOXIDE   Oral   chlordiazePOXIDE 75 milliGRAM(s) Oral every 8 hours  folic acid 1 milliGRAM(s) Oral daily  heparin  Injectable 5000 Unit(s) SubCutaneous every 8 hours  influenza   Vaccine 0.5 milliLiter(s) IntraMuscular once  losartan 100 milliGRAM(s) Oral daily  potassium chloride    Tablet ER 40 milliEquivalent(s) Oral every 4 hours  thiamine 100 milliGRAM(s) Oral daily  venlafaxine XR. 150 milliGRAM(s) Oral daily    MEDICATIONS  (PRN):  acetaminophen   Tablet. 650 milliGRAM(s) Oral every 6 hours PRN Headache and/or severe pain  ALBUTerol    90 MICROgram(s) HFA Inhaler 2 Puff(s) Inhalation every 6 hours PRN Wheezing  aspirin 325 milliGRAM(s) Oral daily PRN headache  LORazepam     Tablet 2 milliGRAM(s) Oral every 2 hours PRN CIWA-Ar score increase by 2 points and a total score of 7 or less  LORazepam   Injectable 2 milliGRAM(s) IV Push every 1 hour PRN CIWA-Ar score 8 or greater      T(C): 36.7 (18 @ 06:34), Max: 37.3 (18 @ 09:40)  HR: 80 (18 @ 06:34) (69 - 91)  BP: 154/96 (18 @ 06:34) (142/83 - 173/113)  RR: 18 (18 @ 06:34) (18 - 18)  SpO2: 100% (18 @ 06:34) (98% - 100%)  CAPILLARY BLOOD GLUCOSE        I&O's Summary    2018 07:01  -  2018 07:00  --------------------------------------------------------  IN: 0 mL / OUT: 600 mL / NET: -600 mL        PHYSICAL EXAM  GENERAL: NAD, well-developed  HEAD:  Atraumatic, Normocephalic  EYES: EOMI, PERRLA, conjunctiva and sclera clear  CHEST/LUNG: Clear to auscultation bilaterally; No wheeze  HEART: +S1 +S2, Regular rate and rhythm  ABDOMEN: Soft, Nontender, Nondistended; Bowel sounds present  NEURO: No focal neurologic deficits, sensation and motor function grossly intact. Slight tremors present.   EXTREMITIES:  Pink and warm, Peripheral Pulses intact      LABS:                        16.1   4.3   )-----------( 272      ( 2018 22:43 )             45.7     02-05    140  |  102  |  11  ----------------------------<  99  3.0<L>   |  22  |  0.80    Ca    8.6      2018 00:51  Phos  3.8     02-05  Mg     1.7     02-05    TPro  6.1  /  Alb  3.7  /  TBili  0.8  /  DBili  0.2  /  AST  36  /  ALT  39  /  AlkPhos  62  02-04          Urinalysis Basic - ( 2018 09:01 )    Color: Yellow / Appearance: Clear / S.015 / pH: x  Gluc: x / Ketone: Negative  / Bili: Negative / Urobili: 1.0 mg/dL   Blood: x / Protein: Negative mg/dL / Nitrite: Negative   Leuk Esterase: Negative / RBC: x / WBC x   Sq Epi: x / Non Sq Epi: x / Bacteria: x        RADIOLOGY & ADDITIONAL TESTS:    Imaging Personally Reviewed:  Consultant(s) Notes Reviewed:    Care Discussed with Consultants/Other Providers: Patient is a 42y old  Male who presents with a chief complaint of My brother brought me to hospital for detox (2018 08:10)      OVERNIGHT EVENTS: No acute events overnight.  SUBJECTIVE: Patient seen and examined at bedside. Denies CP, SOB, abdominal pain/N/V, F/C/NS.       MEDICATIONS  (STANDING):  chlordiazePOXIDE   Oral   chlordiazePOXIDE 75 milliGRAM(s) Oral every 8 hours  folic acid 1 milliGRAM(s) Oral daily  heparin  Injectable 5000 Unit(s) SubCutaneous every 8 hours  influenza   Vaccine 0.5 milliLiter(s) IntraMuscular once  losartan 100 milliGRAM(s) Oral daily  potassium chloride    Tablet ER 40 milliEquivalent(s) Oral every 4 hours  thiamine 100 milliGRAM(s) Oral daily  venlafaxine XR. 150 milliGRAM(s) Oral daily    MEDICATIONS  (PRN):  acetaminophen   Tablet. 650 milliGRAM(s) Oral every 6 hours PRN Headache and/or severe pain  ALBUTerol    90 MICROgram(s) HFA Inhaler 2 Puff(s) Inhalation every 6 hours PRN Wheezing  aspirin 325 milliGRAM(s) Oral daily PRN headache  LORazepam     Tablet 2 milliGRAM(s) Oral every 2 hours PRN CIWA-Ar score increase by 2 points and a total score of 7 or less  LORazepam   Injectable 2 milliGRAM(s) IV Push every 1 hour PRN CIWA-Ar score 8 or greater      T(C): 36.7 (18 @ 06:34), Max: 37.3 (18 @ 09:40)  HR: 80 (18 @ 06:34) (69 - 91)  BP: 154/96 (18 @ 06:34) (142/83 - 173/113)  RR: 18 (18 @ 06:34) (18 - 18)  SpO2: 100% (18 @ 06:34) (98% - 100%)  CAPILLARY BLOOD GLUCOSE        I&O's Summary    2018 07:01  -  2018 07:00  --------------------------------------------------------  IN: 0 mL / OUT: 600 mL / NET: -600 mL        PHYSICAL EXAM  GENERAL: NAD, well-developed  HEAD:  Atraumatic, Normocephalic  EYES: EOMI, PERRLA, conjunctiva and sclera clear  CHEST/LUNG: Clear to auscultation bilaterally; No wheeze  HEART: +S1 +S2, Regular rate and rhythm  ABDOMEN: Soft, Nontender, Nondistended; Bowel sounds present  NEURO: No focal neurologic deficits, sensation and motor function grossly intact. Slight tremors present.   EXTREMITIES:  Pink and warm, Peripheral Pulses intact      LABS:  personally reviewed                        16.1   4.3   )-----------( 272      ( 2018 22:43 )             45.7     02-05    140  |  102  |  11  ----------------------------<  99  3.0<L>   |  22  |  0.80    Ca    8.6      2018 00:51  Phos  3.8     02-05  Mg     1.7     02-05    TPro  6.1  /  Alb  3.7  /  TBili  0.8  /  DBili  0.2  /  AST  36  /  ALT  39  /  AlkPhos  62  02-04          Urinalysis Basic - ( 2018 09:01 )    Color: Yellow / Appearance: Clear / S.015 / pH: x  Gluc: x / Ketone: Negative  / Bili: Negative / Urobili: 1.0 mg/dL   Blood: x / Protein: Negative mg/dL / Nitrite: Negative   Leuk Esterase: Negative / RBC: x / WBC x   Sq Epi: x / Non Sq Epi: x / Bacteria: x        RADIOLOGY & ADDITIONAL TESTS:    Imaging Personally Reviewed:  Consultant(s) Notes Reviewed:    Care Discussed with Consultants/Other Providers:

## 2018-02-05 NOTE — DISCHARGE NOTE ADULT - MEDICATION SUMMARY - MEDICATIONS TO TAKE
I will START or STAY ON the medications listed below when I get home from the hospital:    losartan 100 mg oral tablet  -- 1 tab(s) by mouth once a day  -- Indication: For Hypertension    mirtazapine 7.5 mg oral tablet  -- 1 tab(s) by mouth once a day (at bedtime)  -- Indication: For Depression, unspecified depression type    venlafaxine extended release  -- 150  by mouth once a day  -- Indication: For Depression, unspecified depression type    Proventil HFA 90 mcg/inh inhalation aerosol  -- 2 puff(s) inhaled 4 times a day, As Needed  -- Indication: For Need for prophylactic measure    hydrALAZINE 25 mg oral tablet  -- 1 tab(s) by mouth 3 times a day   -- Indication: For Hypertension

## 2018-02-05 NOTE — DISCHARGE NOTE ADULT - CARE PROVIDER_API CALL
Aydee Tirado), Psychiatry  [address] [place] [address]  Phone: [phone]  Fax: [phone]    Theo Valerio), Internal Medicine  [address] [place] [id number]  Phone: [phone]  Fax: [phone]

## 2018-02-05 NOTE — DISCHARGE NOTE ADULT - CONDITIONS AT DISCHARGE
axox4, admitted for ETOH withdrawal, pt stable no distress noted, ambulatory, denies pain, discharge instructions provided wife at bedside, ivl removed.

## 2018-02-05 NOTE — PROGRESS NOTE ADULT - PROBLEM SELECTOR PLAN 1
On CIWA with librium taper and ativan PRN for breakthrough withdrawal symptoms. Last drink Sat 8am, monitor closely, no hx of withdrawal seizures but notes hallucinates assoc w/ shakes when not drinking in the past which likely represents DTs  - discussed considering naltrexone as outpt.  - social work consult going forward.  - Monitor for electrolyte derangements

## 2018-02-05 NOTE — DISCHARGE NOTE ADULT - CARE PROVIDERS DIRECT ADDRESSES
,DirectAddress_Unknown,[email or web address]

## 2018-02-05 NOTE — DISCHARGE NOTE ADULT - PLAN OF CARE
Detoxification You came in for alcohol detoxification. You were closely monitored and treated with an oral taper of Librium, a medication used to protect you from having withdrawal seizures. You completed the course of medications and are now safe to be discharged.   - Please follow up with your primary care doctor or schedule an appointment at the medicine clinic we have here for further follow up in 1-2 weeks. See below for contact information You were seen by the psychiatrists while you were admitted for evaluation of your depression and were started on a new medication called Remeron in addition to your home dose of effexor. Please continue to take both as directed.    - Please schedule an appointment with your psychiatrist or at the [facility] addiction recovery center by calling [phone] for further outpatient management. You had very elevated blood pressures while in the hospital even though you were continued on your home dose of losartan. You required additional medications to lower your blood pressure. This could be either due to worsening hypertension or from the process of alcohol detoxification. Please continue to take your home dose of Losartan. You are also being discharged on a new medication called hydralazine. Please take as directed.   - Please make an appointment with your PCP or at our clinic in 1-2 weeks for further outpatient management and follow up. You came in for alcohol detoxification. You were closely monitored and treated with an oral taper of Librium, a medication used to protect you from having withdrawal seizures. You completed the course of medications and are now safe to be discharged.   - Please follow up with your primary care doctor or schedule an appointment at the medicine clinic we have here for further follow up in 1-2 weeks. See below for contact information:   - Please schedule an appointment with your psychiatrist or at the [facility] addiction recovery center by calling [phone] for further outpatient management. You had very elevated blood pressures while in the hospital even though you were continued on your home dose of losartan. You required additional medications to lower your blood pressure. This could be either due to worsening hypertension or from the process of alcohol detoxification. Please continue to take your home dose of Losartan. You are also being discharged on a new medication called hydralazine. Please take as directed.   - Please make an appointment with your PCP or at our clinic ([address] [place] [address], [phone]) in 1-2 weeks for further outpatient management and follow up. You were seen by the psychiatrists while you were admitted for evaluation of your depression and were started on a new medication called Remeron in addition to your home dose of effexor. Please continue to take both as directed.    - Please schedule an appointment with your psychiatrist or PCP or at the [facility] addiction recovery center by calling [phone] for further outpatient management. You had very elevated blood pressures while in the hospital even though you were continued on your home dose of losartan. You required additional medications to lower your blood pressure. This could be either due to worsening hypertension or from the process of alcohol detoxification. Please continue to take your home dose of Losartan. You are also being discharged on a new medication called hydralazine. Please take as directed.   - Please make an appointment with your PCP Dr. Fitzgerald or at our clinic ([address] [place] [address], [phone]) in 1-2 weeks for further outpatient management and follow up.

## 2018-02-05 NOTE — DISCHARGE NOTE ADULT - CARE PLAN
Principal Discharge DX:	Alcohol abuse  Goal:	Detoxification  Assessment and plan of treatment:	You came in for alcohol detoxification. You were closely monitored and treated with an oral taper of Librium, a medication used to protect you from having withdrawal seizures. You completed the course of medications and are now safe to be discharged.   - Please follow up with your primary care doctor or schedule an appointment at the medicine clinic we have here for further follow up in 1-2 weeks. See below for contact information  Secondary Diagnosis:	Depression, unspecified depression type  Assessment and plan of treatment:	You were seen by the psychiatrists while you were admitted for evaluation of your depression and were started on a new medication called Remeron in addition to your home dose of effexor. Please continue to take both as directed.    - Please schedule an appointment with your psychiatrist or at the [facility] addiction recovery center by calling [phone] for further outpatient management.  Secondary Diagnosis:	Hypertension  Assessment and plan of treatment:	You had very elevated blood pressures while in the hospital even though you were continued on your home dose of losartan. You required additional medications to lower your blood pressure. This could be either due to worsening hypertension or from the process of alcohol detoxification. Please continue to take your home dose of Losartan. You are also being discharged on a new medication called hydralazine. Please take as directed.   - Please make an appointment with your PCP or at our clinic in 1-2 weeks for further outpatient management and follow up. Principal Discharge DX:	Alcohol abuse  Goal:	Detoxification  Assessment and plan of treatment:	You came in for alcohol detoxification. You were closely monitored and treated with an oral taper of Librium, a medication used to protect you from having withdrawal seizures. You completed the course of medications and are now safe to be discharged.   - Please follow up with your primary care doctor or schedule an appointment at the medicine clinic we have here for further follow up in 1-2 weeks. See below for contact information:   - Please schedule an appointment with your psychiatrist or at the [facility] addiction [facility] by calling [phone] for further outpatient management.  Secondary Diagnosis:	Depression, unspecified depression type  Assessment and plan of treatment:	You were seen by the psychiatrists while you were admitted for evaluation of your depression and were started on a new medication called Remeron in addition to your home dose of effexor. Please continue to take both as directed.    - Please schedule an appointment with your psychiatrist or at the [facility] by calling [phone] for further outpatient management.  Secondary Diagnosis:	Hypertension  Assessment and plan of treatment:	You had very elevated blood pressures while in the hospital even though you were continued on your home dose of losartan. You required additional medications to lower your blood pressure. This could be either due to worsening hypertension or from the process of alcohol detoxification. Please continue to take your home dose of Losartan. You are also being discharged on a new medication called hydralazine. Please take as directed.   - Please make an appointment with your PCP or at our clinic ([address] [place] [address], [phone]) in 1-2 weeks for further outpatient management and follow up. Principal Discharge DX:	Alcohol abuse  Goal:	Detoxification  Assessment and plan of treatment:	You came in for alcohol detoxification. You were closely monitored and treated with an oral taper of Librium, a medication used to protect you from having withdrawal seizures. You completed the course of medications and are now safe to be discharged.   - Please follow up with your primary care doctor or schedule an appointment at the medicine clinic we have here for further follow up in 1-2 weeks. See below for contact information:   - Please schedule an appointment with your psychiatrist or at the [facility] addiction [facility] by calling [phone] for further outpatient management.  Secondary Diagnosis:	Depression, unspecified depression type  Assessment and plan of treatment:	You were seen by the psychiatrists while you were admitted for evaluation of your depression and were started on a new medication called Remeron in addition to your home dose of effexor. Please continue to take both as directed.    - Please schedule an appointment with your psychiatrist or PCP or at the [facility] addiction [facility] by calling [phone] for further outpatient management.  Secondary Diagnosis:	Hypertension  Assessment and plan of treatment:	You had very elevated blood pressures while in the hospital even though you were continued on your home dose of losartan. You required additional medications to lower your blood pressure. This could be either due to worsening hypertension or from the process of alcohol detoxification. Please continue to take your home dose of Losartan. You are also being discharged on a new medication called hydralazine. Please take as directed.   - Please make an appointment with your PCP Dr. Fitzgerald or at our clinic ([address] [place] [address], [phone]) in 1-2 weeks for further outpatient management and follow up.

## 2018-02-06 DIAGNOSIS — F10.230 ALCOHOL DEPENDENCE WITH WITHDRAWAL, UNCOMPLICATED: ICD-10-CM

## 2018-02-06 DIAGNOSIS — F33.1 MAJOR DEPRESSIVE DISORDER, RECURRENT, MODERATE: ICD-10-CM

## 2018-02-06 DIAGNOSIS — I16.9 HYPERTENSIVE CRISIS, UNSPECIFIED: ICD-10-CM

## 2018-02-06 LAB
ANION GAP SERPL CALC-SCNC: 18 MMOL/L — HIGH (ref 5–17)
BASOPHILS # BLD AUTO: 0.07 K/UL — SIGNIFICANT CHANGE UP (ref 0–0.2)
BASOPHILS NFR BLD AUTO: 1.7 % — SIGNIFICANT CHANGE UP (ref 0–2)
BUN SERPL-MCNC: 8 MG/DL — SIGNIFICANT CHANGE UP (ref 7–23)
CALCIUM SERPL-MCNC: 9.2 MG/DL — SIGNIFICANT CHANGE UP (ref 8.4–10.5)
CHLORIDE SERPL-SCNC: 104 MMOL/L — SIGNIFICANT CHANGE UP (ref 96–108)
CO2 SERPL-SCNC: 20 MMOL/L — LOW (ref 22–31)
CREAT SERPL-MCNC: 0.71 MG/DL — SIGNIFICANT CHANGE UP (ref 0.5–1.3)
EOSINOPHIL # BLD AUTO: 0.1 K/UL — SIGNIFICANT CHANGE UP (ref 0–0.5)
EOSINOPHIL NFR BLD AUTO: 2.4 % — SIGNIFICANT CHANGE UP (ref 0–6)
GLUCOSE SERPL-MCNC: 90 MG/DL — SIGNIFICANT CHANGE UP (ref 70–99)
HCT VFR BLD CALC: 42.8 % — SIGNIFICANT CHANGE UP (ref 39–50)
HGB BLD-MCNC: 14.1 G/DL — SIGNIFICANT CHANGE UP (ref 13–17)
IMM GRANULOCYTES NFR BLD AUTO: 0.2 % — SIGNIFICANT CHANGE UP (ref 0–1.5)
LYMPHOCYTES # BLD AUTO: 1.28 K/UL — SIGNIFICANT CHANGE UP (ref 1–3.3)
LYMPHOCYTES # BLD AUTO: 30.9 % — SIGNIFICANT CHANGE UP (ref 13–44)
MAGNESIUM SERPL-MCNC: 2.1 MG/DL — SIGNIFICANT CHANGE UP (ref 1.6–2.6)
MCHC RBC-ENTMCNC: 31.8 PG — SIGNIFICANT CHANGE UP (ref 27–34)
MCHC RBC-ENTMCNC: 32.9 GM/DL — SIGNIFICANT CHANGE UP (ref 32–36)
MCV RBC AUTO: 96.6 FL — SIGNIFICANT CHANGE UP (ref 80–100)
MONOCYTES # BLD AUTO: 0.23 K/UL — SIGNIFICANT CHANGE UP (ref 0–0.9)
MONOCYTES NFR BLD AUTO: 5.6 % — SIGNIFICANT CHANGE UP (ref 2–14)
NEUTROPHILS # BLD AUTO: 2.45 K/UL — SIGNIFICANT CHANGE UP (ref 1.8–7.4)
NEUTROPHILS NFR BLD AUTO: 59.2 % — SIGNIFICANT CHANGE UP (ref 43–77)
PHOSPHATE SERPL-MCNC: 4.1 MG/DL — SIGNIFICANT CHANGE UP (ref 2.5–4.5)
PLATELET # BLD AUTO: 192 K/UL — SIGNIFICANT CHANGE UP (ref 150–400)
POTASSIUM SERPL-MCNC: 3.3 MMOL/L — LOW (ref 3.5–5.3)
POTASSIUM SERPL-SCNC: 3.3 MMOL/L — LOW (ref 3.5–5.3)
RBC # BLD: 4.43 M/UL — SIGNIFICANT CHANGE UP (ref 4.2–5.8)
RBC # FLD: 15.8 % — HIGH (ref 10.3–14.5)
SODIUM SERPL-SCNC: 142 MMOL/L — SIGNIFICANT CHANGE UP (ref 135–145)
TSH SERPL-MCNC: 1.65 UIU/ML — SIGNIFICANT CHANGE UP (ref 0.27–4.2)
WBC # BLD: 4.14 K/UL — SIGNIFICANT CHANGE UP (ref 3.8–10.5)
WBC # FLD AUTO: 4.14 K/UL — SIGNIFICANT CHANGE UP (ref 3.8–10.5)

## 2018-02-06 PROCEDURE — 99223 1ST HOSP IP/OBS HIGH 75: CPT

## 2018-02-06 PROCEDURE — 99233 SBSQ HOSP IP/OBS HIGH 50: CPT | Mod: GC

## 2018-02-06 RX ORDER — HYDRALAZINE HCL 50 MG
50 TABLET ORAL THREE TIMES A DAY
Qty: 0 | Refills: 0 | Status: DISCONTINUED | OUTPATIENT
Start: 2018-02-06 | End: 2018-02-09

## 2018-02-06 RX ORDER — POTASSIUM CHLORIDE 20 MEQ
40 PACKET (EA) ORAL EVERY 4 HOURS
Qty: 0 | Refills: 0 | Status: COMPLETED | OUTPATIENT
Start: 2018-02-06 | End: 2018-02-06

## 2018-02-06 RX ADMIN — LOSARTAN POTASSIUM 100 MILLIGRAM(S): 100 TABLET, FILM COATED ORAL at 06:40

## 2018-02-06 RX ADMIN — Medication 75 MILLIGRAM(S): at 00:19

## 2018-02-06 RX ADMIN — Medication 40 MILLIEQUIVALENT(S): at 17:10

## 2018-02-06 RX ADMIN — Medication 1 MILLIGRAM(S): at 12:25

## 2018-02-06 RX ADMIN — Medication 150 MILLIGRAM(S): at 12:25

## 2018-02-06 RX ADMIN — Medication 25 MILLIGRAM(S): at 06:40

## 2018-02-06 RX ADMIN — Medication 2 MILLIGRAM(S): at 10:55

## 2018-02-06 RX ADMIN — Medication 25 MILLIGRAM(S): at 13:34

## 2018-02-06 RX ADMIN — Medication 40 MILLIEQUIVALENT(S): at 13:34

## 2018-02-06 RX ADMIN — Medication 75 MILLIGRAM(S): at 23:13

## 2018-02-06 RX ADMIN — Medication 75 MILLIGRAM(S): at 12:29

## 2018-02-06 RX ADMIN — HEPARIN SODIUM 5000 UNIT(S): 5000 INJECTION INTRAVENOUS; SUBCUTANEOUS at 06:40

## 2018-02-06 RX ADMIN — Medication 100 MILLIGRAM(S): at 12:25

## 2018-02-06 RX ADMIN — Medication 50 MILLIGRAM(S): at 23:13

## 2018-02-06 RX ADMIN — Medication 40 MILLIEQUIVALENT(S): at 23:13

## 2018-02-06 NOTE — BEHAVIORAL HEALTH ASSESSMENT NOTE - NSBHCHARTREVIEWLAB_PSY_A_CORE FT
02-06    142  |  104  |  8   ----------------------------<  90  3.3<L>   |  20<L>  |  0.71    Ca    9.2      06 Feb 2018 08:22  Phos  4.1     02-06  Mg     2.1     02-06

## 2018-02-06 NOTE — BEHAVIORAL HEALTH ASSESSMENT NOTE - SUMMARY
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  PT AOx3 on exam, no tremors, some inattentiveness, denies HA, N/V, abd pain, AVH, or diaphoresis.  CIWA 0, VSS.  Pt on Effexor given by PCP, has found benefits from it, on a hiatus from psychotherapy 2/2 insurance issues, no SI/HI, chronic insomnia.

## 2018-02-06 NOTE — BEHAVIORAL HEALTH ASSESSMENT NOTE - NSBHCHARTREVIEWIMAGING_PSY_A_CORE FT
< from: CT Head No Cont (02.03.18 @ 23:07) >    IMPRESSION:     No evidence of acute intracranial hemorrhage, midline shift or CT   evidence of acute territorial infarct.    If the patient's symptoms persist, consider short interval follow-up head   CT or brain MRI if there are no MRI contraindications.            < end of copied text >

## 2018-02-06 NOTE — BEHAVIORAL HEALTH ASSESSMENT NOTE - NSBHCONSULTMEDS_PSY_A_CORE FT
1. C/w home dose Effexor.  Add Remeron 7.5mg PO qhS    2. C/w Librium taper as ordered    3. CIWA, thiamine/MVI/folic acid.  Thiamine 500mg IV tid x9 doses.    4. PRN: Ativan 2mg PO q2h PRN sx-triggered CIWA    5.  for substance abuse/[facility] referral resources.  OP psych f/u at [facility]: [phone].

## 2018-02-06 NOTE — BEHAVIORAL HEALTH ASSESSMENT NOTE - NSBHCHARTREVIEWINVESTIGATE_PSY_A_CORE FT
< from: 12 Lead ECG (02.03.18 @ 22:20) >      Ventricular Rate 78 BPM    Atrial Rate 78 BPM    P-R Interval 140 ms    QRS Duration 94 ms     ms    QTc 405 ms    P Axis 59 degrees    R Axis 12 degrees    T Axis 37 degrees    Diagnosis Line NORMAL SINUS RHYTHM  NORMAL ECG    < end of copied text >

## 2018-02-06 NOTE — BEHAVIORAL HEALTH ASSESSMENT NOTE - RISK ASSESSMENT
Risk factors: active substance abuse    Protective factors: no current SIIP/HIIP, no h/o SA/SIB, no h/o psych admissions, no access to weapons, good physical health, no psychosis, domiciled, intact marriage, social supports, compliant with treatment, help-seeking behaviors    Overall, pt is a low risk of harm and does not require psychiatric admission.

## 2018-02-06 NOTE — BEHAVIORAL HEALTH ASSESSMENT NOTE - NSBHSOCIALHXDETAILSFT_PSY_A_CORE
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

## 2018-02-06 NOTE — BEHAVIORAL HEALTH ASSESSMENT NOTE - OTHER PAST PSYCHIATRIC HISTORY (INCLUDE DETAILS REGARDING ONSET, COURSE OF ILLNESS, INPATIENT/OUTPATIENT TREATMENT)
h/o Depression in treatment with therapist, stopped 1 year ago d/t insurance issues, PCP gives Effexor

## 2018-02-06 NOTE — PROGRESS NOTE ADULT - PROBLEM SELECTOR PLAN 3
DVT PPX - IMPROVE score 0, no chemoprophylaxis indicated. Ambulate as tolerated.         Marianna Rey D.O.  Medicine Team 3 Intern  Pager [phone] Denies SI/HI. On Venlafaxine, continue.   - Follow up Psych consultation.

## 2018-02-06 NOTE — PROGRESS NOTE ADULT - SUBJECTIVE AND OBJECTIVE BOX
Patient is a 42y old  Male who presents with a chief complaint of My brother brought me to hospital for detox (2018 10:08)      OVERNIGHT EVENTS: Required PRN Ativan Overnight.   SUBJECTIVE: Patient seen and examined at bedside. Complains of weakness and cough, denies CP, SOB, abdominal pain/N/V. Tolerating diet.       MEDICATIONS  (STANDING):  chlordiazePOXIDE   Oral   chlordiazePOXIDE 75 milliGRAM(s) Oral every 12 hours  folic acid 1 milliGRAM(s) Oral daily  heparin  Injectable 5000 Unit(s) SubCutaneous every 8 hours  hydrALAZINE 25 milliGRAM(s) Oral three times a day  influenza   Vaccine 0.5 milliLiter(s) IntraMuscular once  losartan 100 milliGRAM(s) Oral daily  thiamine 100 milliGRAM(s) Oral daily  venlafaxine XR. 150 milliGRAM(s) Oral daily    MEDICATIONS  (PRN):  acetaminophen   Tablet. 650 milliGRAM(s) Oral every 6 hours PRN Headache and/or severe pain  ALBUTerol    90 MICROgram(s) HFA Inhaler 2 Puff(s) Inhalation every 6 hours PRN Wheezing  aspirin 325 milliGRAM(s) Oral daily PRN headache  LORazepam     Tablet 2 milliGRAM(s) Oral every 2 hours PRN CIWA-Ar score increase by 2 points and a total score of 7 or less  LORazepam   Injectable 2 milliGRAM(s) IV Push every 1 hour PRN CIWA-Ar score 8 or greater      T(C): 36.3 (18 @ 05:24), Max: 37.3 (18 @ 14:02)  HR: 92 (18 @ 05:24) (81 - 102)  BP: 129/91 (18 @ 05:24) (129/91 - 185/137)  RR: 18 (18 @ 05:24) (18 - 18)  SpO2: 97% (18 @ 05:24) (97% - 99%)  CAPILLARY BLOOD GLUCOSE        I&O's Summary    2018 07:01  -  2018 07:00  --------------------------------------------------------  IN: 1260 mL / OUT: 1000 mL / NET: 260 mL        PHYSICAL EXAM  GENERAL: NAD, well-developed  HEAD:  Atraumatic, Normocephalic  EYES: EOMI, PERRLA, conjunctiva and sclera clear  CHEST/LUNG: Clear to auscultation bilaterally; No wheeze  HEART: +S1 +S2, Regular rate and rhythm  ABDOMEN: Soft, Nontender, Nondistended; Bowel sounds present  NEURO: No focal neurologic deficits, sensation and motor function grossly intact. +B/L UE tremors present  EXTREMITIES:  Pink and warm, Peripheral Pulses intact      LABS:        140  |  102  |  11  ----------------------------<  99  3.0<L>   |  22  |  0.80    Ca    8.6      2018 00:51  Phos  3.8     -  Mg     1.7     -05            Urinalysis Basic - ( 2018 09:01 )    Color: Yellow / Appearance: Clear / S.015 / pH: x  Gluc: x / Ketone: Negative  / Bili: Negative / Urobili: 1.0 mg/dL   Blood: x / Protein: Negative mg/dL / Nitrite: Negative   Leuk Esterase: Negative / RBC: x / WBC x   Sq Epi: x / Non Sq Epi: x / Bacteria: x        RADIOLOGY & ADDITIONAL TESTS:    Imaging Personally Reviewed:  Consultant(s) Notes Reviewed:    Care Discussed with Consultants/Other Providers: Patient is a 42y old  Male who presents with a chief complaint of My brother brought me to hospital for detox (2018 10:08)      OVERNIGHT EVENTS: Required PRN Ativan Overnight.   SUBJECTIVE: Patient seen and examined at bedside. Complains of weakness and cough, denies CP, SOB, abdominal pain/N/V. Tolerating diet.       MEDICATIONS  (STANDING):  chlordiazePOXIDE   Oral   chlordiazePOXIDE 75 milliGRAM(s) Oral every 12 hours  folic acid 1 milliGRAM(s) Oral daily  heparin  Injectable 5000 Unit(s) SubCutaneous every 8 hours  hydrALAZINE 25 milliGRAM(s) Oral three times a day  influenza   Vaccine 0.5 milliLiter(s) IntraMuscular once  losartan 100 milliGRAM(s) Oral daily  thiamine 100 milliGRAM(s) Oral daily  venlafaxine XR. 150 milliGRAM(s) Oral daily    MEDICATIONS  (PRN):  acetaminophen   Tablet. 650 milliGRAM(s) Oral every 6 hours PRN Headache and/or severe pain  ALBUTerol    90 MICROgram(s) HFA Inhaler 2 Puff(s) Inhalation every 6 hours PRN Wheezing  aspirin 325 milliGRAM(s) Oral daily PRN headache  LORazepam     Tablet 2 milliGRAM(s) Oral every 2 hours PRN CIWA-Ar score increase by 2 points and a total score of 7 or less  LORazepam   Injectable 2 milliGRAM(s) IV Push every 1 hour PRN CIWA-Ar score 8 or greater      T(C): 36.3 (18 @ 05:24), Max: 37.3 (18 @ 14:02)  HR: 92 (18 @ 05:24) (81 - 102)  BP: 129/91 (18 @ 05:24) (129/91 - 185/137)  RR: 18 (18 @ 05:24) (18 - 18)  SpO2: 97% (18 @ 05:24) (97% - 99%)  CAPILLARY BLOOD GLUCOSE        I&O's Summary    2018 07:01  -  2018 07:00  --------------------------------------------------------  IN: 1260 mL / OUT: 1000 mL / NET: 260 mL        PHYSICAL EXAM  GENERAL: NAD, well-developed  HEAD:  Atraumatic, Normocephalic  EYES: EOMI, PERRLA, conjunctiva and sclera clear  CHEST/LUNG: Clear to auscultation bilaterally; No wheeze  HEART: +S1 +S2, Regular rate and rhythm  ABDOMEN: Soft, Nontender, Nondistended; Bowel sounds present  NEURO: No focal neurologic deficits, sensation and motor function grossly intact. +B/L UE tremors present  EXTREMITIES:  Pink and warm, Peripheral Pulses intact      LABS:  personally reviewed        140  |  102  |  11  ----------------------------<  99  3.0<L>   |  22  |  0.80    Ca    8.6      2018 00:51  Phos  3.8     -  Mg     1.7     -            Urinalysis Basic - ( 2018 09:01 )    Color: Yellow / Appearance: Clear / S.015 / pH: x  Gluc: x / Ketone: Negative  / Bili: Negative / Urobili: 1.0 mg/dL   Blood: x / Protein: Negative mg/dL / Nitrite: Negative   Leuk Esterase: Negative / RBC: x / WBC x   Sq Epi: x / Non Sq Epi: x / Bacteria: x        RADIOLOGY & ADDITIONAL TESTS:    Imaging Personally Reviewed:  Consultant(s) Notes Reviewed:    Care Discussed with Consultants/Other Providers:

## 2018-02-06 NOTE — BEHAVIORAL HEALTH ASSESSMENT NOTE - HPI (INCLUDE ILLNESS QUALITY, SEVERITY, DURATION, TIMING, CONTEXT, MODIFYING FACTORS, ASSOCIATED SIGNS AND SYMPTOMS)
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  PT AOx3 on exam, no tremors, some inattentiveness, denies HA, N/V, abd pain, AVH, or diaphoresis.  CIWA 0, VSS.  States he has had depression which started after his grandmother  and mother left 20 years ago.  Pt is on Effexor given by PCP, states it has been helpful in improving his mood, although at times he still wonders "what am I doing with my life."  Was seeing a therapist up until last year, stopped d/t insurance issues, but found it very helpful.  Endorses chronic sleep issues.  Denies SIIP/HIIP, AVH, manic sx, delusions.  Denies other ilicit substance abuse.  Has been taking Effexor regularly, even when drinking.  Drinks "heavily" for years, longest sobriety in adult life was 8 months following inpt rehab 7 years ago, denies h/o seizures/DTs.  Denies prescription drug abuse.

## 2018-02-06 NOTE — PROGRESS NOTE ADULT - PROBLEM SELECTOR PLAN 1
On CIWA with librium taper and ativan PRN for breakthrough withdrawal symptoms. Last drink Sat 8am, monitor closely, no hx of withdrawal seizures but has had hallucinations and tremors in the past.   - CIWA scores low, +Tremors present, Day 2/4 of Librium Taper.   - F/u AM BMP and replete electrolytes as needed.   - Appreciate social work involvement.

## 2018-02-06 NOTE — BEHAVIORAL HEALTH ASSESSMENT NOTE - DESCRIPTION (FIRST USE, LAST USE, QUANTITY, FREQUENCY, DURATION)
significant use for all of adult life, longest sobriety 8 mos approx 7 years ago after inpt rehab, no h/o seizures/DTs

## 2018-02-06 NOTE — PROGRESS NOTE ADULT - PROBLEM SELECTOR PLAN 4
DVT PPX - IMPROVE score 0, no chemoprophylaxis indicated. Ambulate as tolerated.         Marianna Rey D.O.  Medicine Team 3 Intern  Pager [phone]

## 2018-02-07 LAB
ANION GAP SERPL CALC-SCNC: 14 MMOL/L — SIGNIFICANT CHANGE UP (ref 5–17)
BUN SERPL-MCNC: 10 MG/DL — SIGNIFICANT CHANGE UP (ref 7–23)
CALCIUM SERPL-MCNC: 9.2 MG/DL — SIGNIFICANT CHANGE UP (ref 8.4–10.5)
CHLORIDE SERPL-SCNC: 104 MMOL/L — SIGNIFICANT CHANGE UP (ref 96–108)
CO2 SERPL-SCNC: 22 MMOL/L — SIGNIFICANT CHANGE UP (ref 22–31)
CREAT SERPL-MCNC: 0.72 MG/DL — SIGNIFICANT CHANGE UP (ref 0.5–1.3)
GLUCOSE SERPL-MCNC: 98 MG/DL — SIGNIFICANT CHANGE UP (ref 70–99)
MAGNESIUM SERPL-MCNC: 2.1 MG/DL — SIGNIFICANT CHANGE UP (ref 1.6–2.6)
PHOSPHATE SERPL-MCNC: 3.9 MG/DL — SIGNIFICANT CHANGE UP (ref 2.5–4.5)
POTASSIUM SERPL-MCNC: 3.9 MMOL/L — SIGNIFICANT CHANGE UP (ref 3.5–5.3)
POTASSIUM SERPL-SCNC: 3.9 MMOL/L — SIGNIFICANT CHANGE UP (ref 3.5–5.3)
SODIUM SERPL-SCNC: 140 MMOL/L — SIGNIFICANT CHANGE UP (ref 135–145)

## 2018-02-07 PROCEDURE — 99233 SBSQ HOSP IP/OBS HIGH 50: CPT | Mod: GC

## 2018-02-07 PROCEDURE — 99233 SBSQ HOSP IP/OBS HIGH 50: CPT

## 2018-02-07 RX ORDER — THIAMINE MONONITRATE (VIT B1) 100 MG
100 TABLET ORAL THREE TIMES A DAY
Qty: 0 | Refills: 0 | Status: DISCONTINUED | OUTPATIENT
Start: 2018-02-07 | End: 2018-02-09

## 2018-02-07 RX ORDER — MIRTAZAPINE 45 MG/1
7.5 TABLET, ORALLY DISINTEGRATING ORAL AT BEDTIME
Qty: 0 | Refills: 0 | Status: DISCONTINUED | OUTPATIENT
Start: 2018-02-07 | End: 2018-02-09

## 2018-02-07 RX ADMIN — Medication 150 MILLIGRAM(S): at 13:06

## 2018-02-07 RX ADMIN — Medication 75 MILLIGRAM(S): at 13:06

## 2018-02-07 RX ADMIN — Medication 100 MILLIGRAM(S): at 18:24

## 2018-02-07 RX ADMIN — Medication 1 MILLIGRAM(S): at 13:07

## 2018-02-07 RX ADMIN — Medication 100 MILLIGRAM(S): at 10:41

## 2018-02-07 RX ADMIN — Medication 50 MILLIGRAM(S): at 13:06

## 2018-02-07 RX ADMIN — Medication 50 MILLIGRAM(S): at 05:23

## 2018-02-07 RX ADMIN — Medication 50 MILLIGRAM(S): at 21:34

## 2018-02-07 RX ADMIN — LOSARTAN POTASSIUM 100 MILLIGRAM(S): 100 TABLET, FILM COATED ORAL at 05:23

## 2018-02-07 NOTE — PROGRESS NOTE BEHAVIORAL HEALTH - NSBHFUPINTERVALHXFT_PSY_A_CORE
Pt denies N/V,HA,tremor, or cravings.  AOx3, fine tremor on exam.  VS with some tachycardia.  CIWA score 0.  Denies SIIP/hIIP, depression is minimal today.  Slept poorly; Remeron not started last night.

## 2018-02-07 NOTE — PROGRESS NOTE ADULT - PROBLEM SELECTOR PLAN 2
Pt with persistently elevated BPs 170-180s/110-120s, requiring PRN hydralazine. Likely a component of ETOH withdrawal.   - continue losartan 100mg daily  - Increased Hydralazine from 25mg to 50mg TID  - Continue monitor BP closely and uptitrate meds as needed.

## 2018-02-07 NOTE — PROGRESS NOTE ADULT - PROBLEM SELECTOR PLAN 3
Denies SI/HI. On Venlafaxine, continue.   - Appreciate Psych eval, started on Remeron 7.5mg PO qHS and Thiamine 100mg IV TID x9 doses. Will follow up any new recs.

## 2018-02-07 NOTE — PROGRESS NOTE ADULT - SUBJECTIVE AND OBJECTIVE BOX
Patient is a 42y old  Male who presents with a chief complaint of My brother brought me to hospital for detox (05 Feb 2018 10:08)      OVERNIGHT EVENTS: Hydralazine increased to 50 TID for DBPs 110s.   SUBJECTIVE: Patient seen and examined at bedside. Continues to complain of cough, denies CP/SOB, H/A, abdominal pain/N/V.        MEDICATIONS  (STANDING):  chlordiazePOXIDE   Oral   chlordiazePOXIDE 75 milliGRAM(s) Oral once  folic acid 1 milliGRAM(s) Oral daily  hydrALAZINE 50 milliGRAM(s) Oral three times a day  influenza   Vaccine 0.5 milliLiter(s) IntraMuscular once  losartan 100 milliGRAM(s) Oral daily  mirtazapine 7.5 milliGRAM(s) Oral at bedtime  thiamine Injectable 100 milliGRAM(s) IV Push three times a day  venlafaxine XR. 150 milliGRAM(s) Oral daily    MEDICATIONS  (PRN):  acetaminophen   Tablet. 650 milliGRAM(s) Oral every 6 hours PRN Headache and/or severe pain  ALBUTerol    90 MICROgram(s) HFA Inhaler 2 Puff(s) Inhalation every 6 hours PRN Wheezing  aspirin 325 milliGRAM(s) Oral daily PRN headache  LORazepam     Tablet 2 milliGRAM(s) Oral every 2 hours PRN CIWA-Ar score increase by 2 points and a total score of 7 or less  LORazepam   Injectable 2 milliGRAM(s) IV Push every 1 hour PRN CIWA-Ar score 8 or greater      T(C): 36.6 (02-07-18 @ 04:14), Max: 36.7 (02-06-18 @ 10:30)  HR: 87 (02-07-18 @ 04:14) (87 - 98)  BP: 136/95 (02-07-18 @ 04:14) (136/95 - 166/118)  RR: 17 (02-07-18 @ 04:14) (17 - 18)  SpO2: 98% (02-07-18 @ 04:14) (98% - 99%)  CAPILLARY BLOOD GLUCOSE        I&O's Summary    06 Feb 2018 07:01  -  07 Feb 2018 07:00  --------------------------------------------------------  IN: 1460 mL / OUT: 0 mL / NET: 1460 mL        PHYSICAL EXAM  GENERAL: NAD, well-developed  HEAD:  Atraumatic, Normocephalic  EYES: EOMI, PERRLA, conjunctiva and sclera clear  CHEST/LUNG: Clear to auscultation bilaterally; No wheeze  HEART: +S1 +S2, Regular rate and rhythm  ABDOMEN: Soft, Nontender, Nondistended; Bowel sounds present  NEURO: No focal neurologic deficits, sensation and motor function grossly intact.   EXTREMITIES:  Pink and warm, Peripheral Pulses intact      LABS:                        14.1   4.14  )-----------( 192      ( 06 Feb 2018 07:50 )             42.8     02-06    142  |  104  |  8   ----------------------------<  90  3.3<L>   |  20<L>  |  0.71    Ca    9.2      06 Feb 2018 08:22  Phos  4.1     02-06  Mg     2.1     02-06                RADIOLOGY & ADDITIONAL TESTS:  Imaging Personally Reviewed:  Consultant(s) Notes Reviewed:    Care Discussed with Consultants/Other Providers: Patient is a 42y old  Male who presents with a chief complaint of My brother brought me to hospital for detox (05 Feb 2018 10:08)      OVERNIGHT EVENTS: Hydralazine increased to 50 TID for DBPs 110s.   SUBJECTIVE: Patient seen and examined at bedside. Continues to complain of cough, denies CP/SOB, H/A, abdominal pain/N/V.        MEDICATIONS  (STANDING):  chlordiazePOXIDE   Oral   chlordiazePOXIDE 75 milliGRAM(s) Oral once  folic acid 1 milliGRAM(s) Oral daily  hydrALAZINE 50 milliGRAM(s) Oral three times a day  influenza   Vaccine 0.5 milliLiter(s) IntraMuscular once  losartan 100 milliGRAM(s) Oral daily  mirtazapine 7.5 milliGRAM(s) Oral at bedtime  thiamine Injectable 100 milliGRAM(s) IV Push three times a day  venlafaxine XR. 150 milliGRAM(s) Oral daily    MEDICATIONS  (PRN):  acetaminophen   Tablet. 650 milliGRAM(s) Oral every 6 hours PRN Headache and/or severe pain  ALBUTerol    90 MICROgram(s) HFA Inhaler 2 Puff(s) Inhalation every 6 hours PRN Wheezing  aspirin 325 milliGRAM(s) Oral daily PRN headache  LORazepam     Tablet 2 milliGRAM(s) Oral every 2 hours PRN CIWA-Ar score increase by 2 points and a total score of 7 or less  LORazepam   Injectable 2 milliGRAM(s) IV Push every 1 hour PRN CIWA-Ar score 8 or greater      T(C): 36.6 (02-07-18 @ 04:14), Max: 36.7 (02-06-18 @ 10:30)  HR: 87 (02-07-18 @ 04:14) (87 - 98)  BP: 136/95 (02-07-18 @ 04:14) (136/95 - 166/118)  RR: 17 (02-07-18 @ 04:14) (17 - 18)  SpO2: 98% (02-07-18 @ 04:14) (98% - 99%)  CAPILLARY BLOOD GLUCOSE        I&O's Summary    06 Feb 2018 07:01  -  07 Feb 2018 07:00  --------------------------------------------------------  IN: 1460 mL / OUT: 0 mL / NET: 1460 mL        PHYSICAL EXAM  GENERAL: NAD, well-developed  HEAD:  Atraumatic, Normocephalic  EYES: EOMI, PERRLA, conjunctiva and sclera clear  CHEST/LUNG: Clear to auscultation bilaterally; No wheeze  HEART: +S1 +S2, Regular rate and rhythm  ABDOMEN: Soft, Nontender, Nondistended; Bowel sounds present  NEURO: No focal neurologic deficits, sensation and motor function grossly intact.   EXTREMITIES:  Pink and warm, Peripheral Pulses intact      LABS:  personally reviewed                        14.1   4.14  )-----------( 192      ( 06 Feb 2018 07:50 )             42.8     02-06    142  |  104  |  8   ----------------------------<  90  3.3<L>   |  20<L>  |  0.71    Ca    9.2      06 Feb 2018 08:22  Phos  4.1     02-06  Mg     2.1     02-06                RADIOLOGY & ADDITIONAL TESTS:  Imaging Personally Reviewed:  Consultant(s) Notes Reviewed:  psych, SW  Care Discussed with Consultants/Other Providers:

## 2018-02-07 NOTE — PROGRESS NOTE BEHAVIORAL HEALTH - NSBHCHARTREVIEWVS_PSY_A_CORE FT
]T(C): 36.2 (02-07-18 @ 10:30), Max: 36.6 (02-06-18 @ 21:11)  HR: 109 (02-07-18 @ 18:27) (85 - 112)  BP: 152/100 (02-07-18 @ 18:27) (136/95 - 158/92)  RR: 16 (02-07-18 @ 18:27) (16 - 18)  SpO2: 98% (02-07-18 @ 18:27) (98% - 100%)  Wt(kg): --

## 2018-02-07 NOTE — PROGRESS NOTE BEHAVIORAL HEALTH - NSBHCHARTREVIEWLAB_PSY_A_CORE FT
14.1   4.14  )-----------( 192      ( 06 Feb 2018 07:50 )             42.8     02-07    140  |  104  |  10  ----------------------------<  98  3.9   |  22  |  0.72    Ca    9.2      07 Feb 2018 07:24  Phos  3.9     02-07  Mg     2.1     02-07

## 2018-02-07 NOTE — PROGRESS NOTE ADULT - PROBLEM SELECTOR PLAN 4
DVT PPX - IMPROVE score 0, no chemoprophylaxis indicated. Ambulate as tolerated.         Marianna Rey D.O.  Medicine Team 3 Intern  Pager [phone]

## 2018-02-08 LAB
ANION GAP SERPL CALC-SCNC: 17 MMOL/L — SIGNIFICANT CHANGE UP (ref 5–17)
BUN SERPL-MCNC: 13 MG/DL — SIGNIFICANT CHANGE UP (ref 7–23)
CALCIUM SERPL-MCNC: 9.5 MG/DL — SIGNIFICANT CHANGE UP (ref 8.4–10.5)
CHLORIDE SERPL-SCNC: 103 MMOL/L — SIGNIFICANT CHANGE UP (ref 96–108)
CO2 SERPL-SCNC: 19 MMOL/L — LOW (ref 22–31)
CREAT SERPL-MCNC: 0.85 MG/DL — SIGNIFICANT CHANGE UP (ref 0.5–1.3)
GLUCOSE SERPL-MCNC: 104 MG/DL — HIGH (ref 70–99)
MAGNESIUM SERPL-MCNC: 1.9 MG/DL — SIGNIFICANT CHANGE UP (ref 1.6–2.6)
PHOSPHATE SERPL-MCNC: 4.4 MG/DL — SIGNIFICANT CHANGE UP (ref 2.5–4.5)
POTASSIUM SERPL-MCNC: 3.6 MMOL/L — SIGNIFICANT CHANGE UP (ref 3.5–5.3)
POTASSIUM SERPL-SCNC: 3.6 MMOL/L — SIGNIFICANT CHANGE UP (ref 3.5–5.3)
SODIUM SERPL-SCNC: 139 MMOL/L — SIGNIFICANT CHANGE UP (ref 135–145)

## 2018-02-08 PROCEDURE — 99232 SBSQ HOSP IP/OBS MODERATE 35: CPT

## 2018-02-08 PROCEDURE — 99233 SBSQ HOSP IP/OBS HIGH 50: CPT | Mod: GC

## 2018-02-08 RX ORDER — HYDRALAZINE HCL 50 MG
5 TABLET ORAL ONCE
Qty: 0 | Refills: 0 | Status: COMPLETED | OUTPATIENT
Start: 2018-02-08 | End: 2018-02-08

## 2018-02-08 RX ADMIN — Medication 1 MILLIGRAM(S): at 11:23

## 2018-02-08 RX ADMIN — Medication 50 MILLIGRAM(S): at 00:36

## 2018-02-08 RX ADMIN — Medication 100 MILLIGRAM(S): at 06:38

## 2018-02-08 RX ADMIN — MIRTAZAPINE 7.5 MILLIGRAM(S): 45 TABLET, ORALLY DISINTEGRATING ORAL at 00:37

## 2018-02-08 RX ADMIN — Medication 100 MILLIGRAM(S): at 11:24

## 2018-02-08 RX ADMIN — Medication 5 MILLIGRAM(S): at 04:33

## 2018-02-08 RX ADMIN — Medication 50 MILLIGRAM(S): at 11:23

## 2018-02-08 RX ADMIN — Medication 100 MILLIGRAM(S): at 21:04

## 2018-02-08 RX ADMIN — Medication 650 MILLIGRAM(S): at 05:33

## 2018-02-08 RX ADMIN — Medication 650 MILLIGRAM(S): at 04:33

## 2018-02-08 RX ADMIN — Medication 50 MILLIGRAM(S): at 11:25

## 2018-02-08 RX ADMIN — Medication 50 MILLIGRAM(S): at 21:04

## 2018-02-08 RX ADMIN — Medication 150 MILLIGRAM(S): at 11:23

## 2018-02-08 RX ADMIN — LOSARTAN POTASSIUM 100 MILLIGRAM(S): 100 TABLET, FILM COATED ORAL at 06:38

## 2018-02-08 NOTE — PHYSICAL THERAPY INITIAL EVALUATION ADULT - PERTINENT HX OF CURRENT PROBLEM, REHAB EVAL
42M PMHx EtOH abuse presenting for detox. Pt states he wants to put alcoholism behind him, is tired of the mental and physical anguish that he associates with his EtOH. Drinks vodka, about 6 shots per day, last drink Sat 8am, previous withdrawals in past weeks associated visual hallucinations, gets morning shakes if without a drink, never had withdrawal seizures C/o mild belly pain, also has headache, fell into his wall from stairs at his home. 42 yr old male  PMHx EtOH abuse presenting for detox. Pt states he wants to put alcoholism behind him, is tired of the mental and physical anguish that he associates with his EtOH. Drinks vodka, about 6 shots per day, last drink Sat 8am, previous withdrawals in past weeks associated visual hallucinations, gets morning shakes if without a drink, never had withdrawal seizures C/o mild belly pain, also has headache, fell into his wall from stairs at his home.

## 2018-02-08 NOTE — PROGRESS NOTE BEHAVIORAL HEALTH - NSBHCONSULTMEDS_PSY_A_CORE FT
1. C/w home dose Effexor.  C/w Remeron 7.5mg PO qhS    2. C/w Librium 50mg PO bid for now --> 50mg PO daily --> off    3. CIWA, thiamine/MVI/folic acid    4. PRN: Ativan 2mg PO q2h PRN sx-triggered CIWA    5. SW for substance abuse/NINA referral resources.  OP psych f/u at [facility]: [phone].
1. C/w home dose Effexor.  Add Remeron 7.5mg PO qhS    2. C/w Librium 75mg PO bid for now --> 75mg PO daily --> off    3. CIWA, thiamine/MVI/folic acid    4. PRN: Ativan 2mg PO q2h PRN sx-triggered CIWA    5. SW for substance abuse/NINA referral resources.  OP psych f/u at [facility]: [phone].

## 2018-02-08 NOTE — PROGRESS NOTE BEHAVIORAL HEALTH - SUMMARY
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  PT AOx3 on exam, no tremors, some inattentiveness, denies HA, N/V, abd pain, AVH, or diaphoresis.  CIWA 0, VSS.  Pt on Effexor given by PCP, has found benefits from it, on a hiatus from psychotherapy 2/2 insurance issues, no SI/HI, chronic insomnia.  Tolerating Remeron.
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  PT AOx3 on exam, no tremors, some inattentiveness, denies HA, N/V, abd pain, AVH, or diaphoresis.  CIWA 0, VSS.  Pt on Effexor given by PCP, has found benefits from it, on a hiatus from psychotherapy 2/2 insurance issues, no SI/HI, chronic insomnia.

## 2018-02-08 NOTE — PROGRESS NOTE ADULT - PROBLEM SELECTOR PLAN 4
DVT PPX - IMPROVE score 0, no chemoprophylaxis indicated. Ambulate as tolerated.   DISPO - F/u PT Evaluation and SW      Marianna Rey D.O.  Medicine Team 3 Intern  Pager [phone]

## 2018-02-08 NOTE — PROGRESS NOTE BEHAVIORAL HEALTH - SECONDARY DX1
Major depressive disorder, recurrent episode, moderate
Major depressive disorder, recurrent episode, moderate

## 2018-02-08 NOTE — PROGRESS NOTE ADULT - SUBJECTIVE AND OBJECTIVE BOX
Patient is a 42y old  Male who presents with a chief complaint of My brother brought me to hospital for detox (05 Feb 2018 10:08)      OVERNIGHT EVENTS: Diastolic BP 110s overnight s/p 5mg IV Hydralazine.   SUBJECTIVE: Patient seen and examined at bedside. Denies CP, SOB, abdominal pain/N/V.       MEDICATIONS  (STANDING):  chlordiazePOXIDE 50 milliGRAM(s) Oral every 12 hours  folic acid 1 milliGRAM(s) Oral daily  hydrALAZINE 50 milliGRAM(s) Oral three times a day  influenza   Vaccine 0.5 milliLiter(s) IntraMuscular once  losartan 100 milliGRAM(s) Oral daily  mirtazapine 7.5 milliGRAM(s) Oral at bedtime  thiamine Injectable 100 milliGRAM(s) IV Push three times a day  venlafaxine XR. 150 milliGRAM(s) Oral daily    MEDICATIONS  (PRN):  acetaminophen   Tablet. 650 milliGRAM(s) Oral every 6 hours PRN Headache and/or severe pain  ALBUTerol    90 MICROgram(s) HFA Inhaler 2 Puff(s) Inhalation every 6 hours PRN Wheezing  aspirin 325 milliGRAM(s) Oral daily PRN headache  LORazepam     Tablet 2 milliGRAM(s) Oral every 2 hours PRN CIWA-Ar score increase by 2 points and a total score of 7 or less  LORazepam   Injectable 2 milliGRAM(s) IV Push every 1 hour PRN CIWA-Ar score 8 or greater      T(C): 36.6 (02-08-18 @ 04:13), Max: 36.6 (02-07-18 @ 20:11)  HR: 83 (02-08-18 @ 05:23) (83 - 112)  BP: 137/83 (02-08-18 @ 05:23) (136/96 - 158/99)  RR: 17 (02-08-18 @ 04:13) (16 - 17)  SpO2: 96% (02-08-18 @ 04:13) (95% - 100%)  CAPILLARY BLOOD GLUCOSE        I&O's Summary    07 Feb 2018 07:01  -  08 Feb 2018 07:00  --------------------------------------------------------  IN: 1420 mL / OUT: 0 mL / NET: 1420 mL        PHYSICAL EXAM  GENERAL: NAD, well-developed  HEAD:  Atraumatic, Normocephalic  EYES: EOMI, PERRLA, conjunctiva and sclera clear  CHEST/LUNG: Clear to auscultation bilaterally; No wheeze  HEART: +S1 +S2, Regular rate and rhythm  ABDOMEN: Soft, Nontender, Nondistended; Bowel sounds present  NEURO: No focal neurologic deficits, sensation and motor function grossly intact.   EXTREMITIES:  Pink and warm, Peripheral Pulses intact      LABS:    02-08    139  |  103  |  13  ----------------------------<  104<H>  3.6   |  19<L>  |  0.85    Ca    9.5      08 Feb 2018 07:43  Phos  4.4     02-08  Mg     1.9     02-08                RADIOLOGY & ADDITIONAL TESTS:    Imaging Personally Reviewed:  Consultant(s) Notes Reviewed:    Care Discussed with Consultants/Other Providers:

## 2018-02-08 NOTE — PROGRESS NOTE BEHAVIORAL HEALTH - NSBHCONSULTFOLLOWAFTERCARE_PSY_A_CORE FT
May f/u as outpatient at [facility]- [phone]
May f/u as outpatient at [facility]- [phone]

## 2018-02-08 NOTE — PROGRESS NOTE BEHAVIORAL HEALTH - NSBHFUPINTERVALHXFT_PSY_A_CORE
Pt states he slept very well with Remeron, tolerating well.  Denies N/V, HA, or cravings.  Fine tremor on exam.  AOx3, fine tremor on exam.  VS with some tachycardia.  CIWA score 0.  Denies SIIP/hIIP, depression is minimal today.

## 2018-02-08 NOTE — PROGRESS NOTE ADULT - PROBLEM SELECTOR PLAN 2
Pt with persistently elevated BPs 170-180s/110-120s, requiring PRN hydralazine. Likely a component of ETOH withdrawal.   - Continue losartan 100mg daily  - Continue Hydralazine 50mg TID

## 2018-02-08 NOTE — PROGRESS NOTE BEHAVIORAL HEALTH - NSBHCHARTREVIEWLAB_PSY_A_CORE FT
02-08    139  |  103  |  13  ----------------------------<  104<H>  3.6   |  19<L>  |  0.85    Ca    9.5      08 Feb 2018 07:43  Phos  4.4     02-08  Mg     1.9     02-08

## 2018-02-08 NOTE — PROGRESS NOTE BEHAVIORAL HEALTH - NSBHCHARTREVIEWVS_PSY_A_CORE FT
T(C): 36.3 (02-08-18 @ 11:50), Max: 36.6 (02-07-18 @ 20:11)  HR: 85 (02-08-18 @ 11:50) (83 - 109)  BP: 145/102 (02-08-18 @ 11:50) (137/83 - 158/99)  RR: 18 (02-08-18 @ 11:50) (16 - 18)  SpO2: 98% (02-08-18 @ 11:50) (95% - 98%)  Wt(kg): --

## 2018-02-08 NOTE — PROGRESS NOTE ADULT - PROBLEM SELECTOR PLAN 1
On CIWA with librium taper and ativan PRN for breakthrough withdrawal symptoms. Last drink Sat 8am, monitor closely, no hx of withdrawal seizures but has had hallucinations and tremors in the past.   - Continue Librium taper, last day tomorrow   - Continue IV Thiamine   - Appreciate social work involvement.

## 2018-02-09 VITALS
OXYGEN SATURATION: 98 % | SYSTOLIC BLOOD PRESSURE: 148 MMHG | HEART RATE: 105 BPM | TEMPERATURE: 99 F | RESPIRATION RATE: 18 BRPM | DIASTOLIC BLOOD PRESSURE: 90 MMHG

## 2018-02-09 PROCEDURE — 99239 HOSP IP/OBS DSCHRG MGMT >30: CPT

## 2018-02-09 RX ORDER — MIRTAZAPINE 45 MG/1
1 TABLET, ORALLY DISINTEGRATING ORAL
Qty: 30 | Refills: 0 | OUTPATIENT
Start: 2018-02-09 | End: 2018-03-10

## 2018-02-09 RX ORDER — HYDRALAZINE HCL 50 MG
1 TABLET ORAL
Qty: 90 | Refills: 0 | OUTPATIENT
Start: 2018-02-09 | End: 2018-03-10

## 2018-02-09 RX ORDER — MIRTAZAPINE 45 MG/1
7.5 TABLET, ORALLY DISINTEGRATING ORAL AT BEDTIME
Qty: 0 | Refills: 0 | Status: DISCONTINUED | OUTPATIENT
Start: 2018-02-09 | End: 2018-02-09

## 2018-02-09 RX ADMIN — Medication 50 MILLIGRAM(S): at 13:17

## 2018-02-09 RX ADMIN — Medication 50 MILLIGRAM(S): at 06:33

## 2018-02-09 RX ADMIN — Medication 100 MILLIGRAM(S): at 12:13

## 2018-02-09 RX ADMIN — LOSARTAN POTASSIUM 100 MILLIGRAM(S): 100 TABLET, FILM COATED ORAL at 06:33

## 2018-02-09 RX ADMIN — INFLUENZA VIRUS VACCINE 0.5 MILLILITER(S): 15; 15; 15; 15 SUSPENSION INTRAMUSCULAR at 15:06

## 2018-02-09 RX ADMIN — Medication 150 MILLIGRAM(S): at 13:17

## 2018-02-09 RX ADMIN — Medication 1 MILLIGRAM(S): at 12:15

## 2018-02-09 RX ADMIN — MIRTAZAPINE 7.5 MILLIGRAM(S): 45 TABLET, ORALLY DISINTEGRATING ORAL at 00:30

## 2018-02-09 RX ADMIN — Medication 25 MILLIGRAM(S): at 12:14

## 2018-02-09 RX ADMIN — Medication 25 MILLIGRAM(S): at 00:30

## 2018-02-09 RX ADMIN — Medication 100 MILLIGRAM(S): at 06:33

## 2018-02-09 NOTE — PROGRESS NOTE ADULT - ATTENDING COMMENTS
Pt with etoh abuse and currently on librium taper, scoring low on CIWA.  Pt still feeling weak but will try to walk around the hallway today.  appreciate SW input    Ester Basilio MD  Division of Hospital Medicine  Pager: [phone]  Office: [phone]
Discharge planning discussed with patient, residents, psych and SW. Patient has completed librium taper regimen and started on anti-depressants. Pt now back to baseline and feels ready to be discharged. Extensive education provided for alcohol cessation. All questions and concerns were addressed    Ester Basilio MD  Division of Hospital Medicine  Pager: [phone]  Office: [phone]
Pt still with diastolic hypertension requiring prn hydralazine for BP control. Remains asymptomatic.  On IV thiamine and librium taper regimen for alcohol withdrawal.  Will discuss with psych and SW for safe discharge planning- likely tomorrow    Ester Basilio MD  Division of Hospital Medicine  Pager: [phone]  Office: [phone]
Pt still with weakness and signs of withdrawal. IV thiamine recommended by psych and will extend librium taper x 2 more days. Pending PT consult    Ester Basilio MD  Division of Hospital Medicine  Pager: [phone]  Office: [phone]
EtOh withdrawal on librium taper, now complicated by hypertensive crisis. Started on hydralazine for BP control with mild improvement. Will continue to monitor and uptitrate meds as needed    Ester Basilio MD  Division of Hospital Medicine  Pager: [phone]  Office: [phone]

## 2018-02-09 NOTE — PROGRESS NOTE ADULT - PROBLEM SELECTOR PLAN 4
DVT PPX - IMPROVE score 0, no chemoprophylaxis indicated. Ambulate as tolerated.   DISPO - DC Home today after last dose of Librium administered.       Marianna Rey D.O.  Medicine Team 3 Intern  Pager [phone]

## 2018-02-09 NOTE — PROGRESS NOTE ADULT - SUBJECTIVE AND OBJECTIVE BOX
Patient is a 42y old  Male who presents with a chief complaint of My brother brought me to hospital for detox (05 Feb 2018 10:08)      OVERNIGHT EVENTS: No acute events overnight.  SUBJECTIVE: Patient seen and examined at bedside. Denies current complaints of CP, SOB, abdominal pain/N/V. CIWA-0.       MEDICATIONS  (STANDING):  chlordiazePOXIDE 25 milliGRAM(s) Oral every 12 hours  folic acid 1 milliGRAM(s) Oral daily  hydrALAZINE 50 milliGRAM(s) Oral three times a day  influenza   Vaccine 0.5 milliLiter(s) IntraMuscular once  losartan 100 milliGRAM(s) Oral daily  mirtazapine 7.5 milliGRAM(s) Oral at bedtime  thiamine Injectable 100 milliGRAM(s) IV Push three times a day  venlafaxine XR. 150 milliGRAM(s) Oral daily    MEDICATIONS  (PRN):  acetaminophen   Tablet. 650 milliGRAM(s) Oral every 6 hours PRN Headache and/or severe pain  ALBUTerol    90 MICROgram(s) HFA Inhaler 2 Puff(s) Inhalation every 6 hours PRN Wheezing  aspirin 325 milliGRAM(s) Oral daily PRN headache  LORazepam     Tablet 2 milliGRAM(s) Oral every 2 hours PRN CIWA-Ar score increase by 2 points and a total score of 7 or less  LORazepam   Injectable 2 milliGRAM(s) IV Push every 1 hour PRN CIWA-Ar score 8 or greater      T(C): 36.7 (02-09-18 @ 11:40), Max: 36.9 (02-08-18 @ 19:19)  HR: 109 (02-09-18 @ 11:40) (85 - 109)  BP: 138/103 (02-09-18 @ 11:40) (127/63 - 157/120)  RR: 18 (02-09-18 @ 11:40) (18 - 18)  SpO2: 99% (02-09-18 @ 11:40) (98% - 99%)  CAPILLARY BLOOD GLUCOSE        I&O's Summary    08 Feb 2018 07:01  -  09 Feb 2018 07:00  --------------------------------------------------------  IN: 1140 mL / OUT: 0 mL / NET: 1140 mL        PHYSICAL EXAM  GENERAL: NAD, well-developed  HEAD:  Atraumatic, Normocephalic  EYES: EOMI, PERRLA, conjunctiva and sclera clear  CHEST/LUNG: Clear to auscultation bilaterally; No wheeze  HEART: +S1 +S2, Regular rate and rhythm  ABDOMEN: Soft, Nontender, Nondistended; Bowel sounds present  NEURO: No focal neurologic deficits, sensation and motor function grossly intact.   EXTREMITIES:  Pink and warm, Peripheral Pulses intact      LABS:    02-08    139  |  103  |  13  ----------------------------<  104<H>  3.6   |  19<L>  |  0.85    Ca    9.5      08 Feb 2018 07:43  Phos  4.4     02-08  Mg     1.9     02-08                RADIOLOGY & ADDITIONAL TESTS:  Imaging Personally Reviewed:  Consultant(s) Notes Reviewed:    Care Discussed with Consultants/Other Providers:

## 2018-02-09 NOTE — PROGRESS NOTE ADULT - PROBLEM SELECTOR PLAN 2
Pt with persistently elevated BPs 170-180s/110-120s, requiring PRN hydralazine. Likely a component of ETOH withdrawal.   - Continue losartan 100mg daily  - Continue Hydralazine 50mg TID, will DC on Hydralazine 25mg TID and outpatient follow up with PCP.

## 2018-05-23 PROCEDURE — 70450 CT HEAD/BRAIN W/O DYE: CPT

## 2018-05-23 PROCEDURE — 80076 HEPATIC FUNCTION PANEL: CPT

## 2018-05-23 PROCEDURE — 96374 THER/PROPH/DIAG INJ IV PUSH: CPT

## 2018-05-23 PROCEDURE — 83735 ASSAY OF MAGNESIUM: CPT

## 2018-05-23 PROCEDURE — 80053 COMPREHEN METABOLIC PANEL: CPT

## 2018-05-23 PROCEDURE — 97161 PT EVAL LOW COMPLEX 20 MIN: CPT

## 2018-05-23 PROCEDURE — 84443 ASSAY THYROID STIM HORMONE: CPT

## 2018-05-23 PROCEDURE — 90686 IIV4 VACC NO PRSV 0.5 ML IM: CPT

## 2018-05-23 PROCEDURE — 80307 DRUG TEST PRSMV CHEM ANLYZR: CPT

## 2018-05-23 PROCEDURE — 96375 TX/PRO/DX INJ NEW DRUG ADDON: CPT

## 2018-05-23 PROCEDURE — 71045 X-RAY EXAM CHEST 1 VIEW: CPT

## 2018-05-23 PROCEDURE — 84100 ASSAY OF PHOSPHORUS: CPT

## 2018-05-23 PROCEDURE — 85027 COMPLETE CBC AUTOMATED: CPT

## 2018-05-23 PROCEDURE — 93005 ELECTROCARDIOGRAM TRACING: CPT

## 2018-05-23 PROCEDURE — 80048 BASIC METABOLIC PNL TOTAL CA: CPT

## 2018-05-23 PROCEDURE — 99285 EMERGENCY DEPT VISIT HI MDM: CPT | Mod: 25

## 2018-05-23 PROCEDURE — 81003 URINALYSIS AUTO W/O SCOPE: CPT

## 2020-07-02 NOTE — PROGRESS NOTE BEHAVIORAL HEALTH - BEHAVIOR
"Requested Prescriptions   Pending Prescriptions Disp Refills     fluticasone-vilanterol (BREO ELLIPTA) 100-25 MCG/INH inhaler 1 Inhaler 3       Inhaled Steroids Protocol Passed - 7/2/2020 10:36 AM        Passed - Patient is age 12 or older        Passed - Recent (12 mo) or future (30 days) visit within the authorizing provider's specialty     Patient has had an office visit with the authorizing provider or a provider within the authorizing providers department within the previous 12 mos or has a future within next 30 days. See \"Patient Info\" tab in inbasket, or \"Choose Columns\" in Meds & Orders section of the refill encounter.              Passed - Medication is active on med list           ondansetron (ZOFRAN) 4 MG tablet 30 tablet 1     Sig: Take 1 tablet (4 mg) by mouth every 8 hours as needed for nausea        Antivertigo/Antiemetic Agents Passed - 7/2/2020 10:36 AM        Passed - Recent (12 mo) or future (30 days) visit within the authorizing provider's specialty     Patient has had an office visit with the authorizing provider or a provider within the authorizing providers department within the previous 12 mos or has a future within next 30 days. See \"Patient Info\" tab in inbasket, or \"Choose Columns\" in Meds & Orders section of the refill encounter.              Passed - Medication is active on med list        Passed - Patient is 18 years of age or older             " Cooperative

## 2021-10-20 NOTE — ED ADULT NURSE NOTE - MUSCULOSKELETAL WDL
Full range of motion of upper and lower extremities, no joint tenderness/swelling. 5-Fu Counseling: 5-Fluorouracil Counseling:  I discussed with the patient the risks of 5-fluorouracil including but not limited to erythema, scaling, itching, weeping, crusting, and pain.

## 2021-12-21 NOTE — BEHAVIORAL HEALTH ASSESSMENT NOTE - NSBHCHARTREVIEWVS_PSY_A_CORE FT
Good Good Good Good Good Good Good T(C): 36.7 (02-06-18 @ 10:30), Max: 37.3 (02-05-18 @ 14:02)  HR: 88 (02-06-18 @ 13:32) (81 - 102)  BP: 148/110 (02-06-18 @ 13:32) (129/91 - 185/137)  RR: 18 (02-06-18 @ 10:30) (18 - 18)  SpO2: 98% (02-06-18 @ 10:30) (97% - 99%)  Wt(kg): -- Good Good Good Good Good

## 2023-10-19 PROBLEM — F10.10 ALCOHOL ABUSE, UNCOMPLICATED: Chronic | Status: ACTIVE | Noted: 2018-02-03

## 2023-10-23 ENCOUNTER — NON-APPOINTMENT (OUTPATIENT)
Age: 48
End: 2023-10-23

## 2023-10-23 PROBLEM — Z00.00 ENCOUNTER FOR PREVENTIVE HEALTH EXAMINATION: Status: ACTIVE | Noted: 2023-10-23

## 2023-10-24 ENCOUNTER — NON-APPOINTMENT (OUTPATIENT)
Age: 48
End: 2023-10-24

## 2023-10-24 ENCOUNTER — APPOINTMENT (OUTPATIENT)
Dept: UROLOGY | Facility: CLINIC | Age: 48
End: 2023-10-24
Payer: COMMERCIAL

## 2023-10-24 VITALS
SYSTOLIC BLOOD PRESSURE: 146 MMHG | HEART RATE: 86 BPM | WEIGHT: 200 LBS | BODY MASS INDEX: 28 KG/M2 | DIASTOLIC BLOOD PRESSURE: 96 MMHG | TEMPERATURE: 97.9 F | HEIGHT: 71 IN

## 2023-10-24 DIAGNOSIS — R79.89 OTHER SPECIFIED ABNORMAL FINDINGS OF BLOOD CHEMISTRY: ICD-10-CM

## 2023-10-24 PROCEDURE — 99203 OFFICE O/P NEW LOW 30 MIN: CPT

## 2023-10-24 RX ORDER — ESCITALOPRAM OXALATE 5 MG/1
TABLET, FILM COATED ORAL
Refills: 0 | Status: ACTIVE | COMMUNITY

## 2023-10-24 RX ORDER — LOSARTAN POTASSIUM 100 MG/1
TABLET, FILM COATED ORAL
Refills: 0 | Status: ACTIVE | COMMUNITY

## 2023-10-24 RX ORDER — AMLODIPINE BESYLATE 5 MG/1
TABLET ORAL
Refills: 0 | Status: ACTIVE | COMMUNITY

## 2023-10-24 RX ORDER — ALBUTEROL 90 MCG
AEROSOL (GRAM) INHALATION
Refills: 0 | Status: ACTIVE | COMMUNITY

## 2023-10-24 RX ORDER — FLUTICASONE PROPION/SALMETEROL 500-50 MCG
BLISTER, WITH INHALATION DEVICE INHALATION
Refills: 0 | Status: ACTIVE | COMMUNITY

## 2023-10-26 ENCOUNTER — TRANSCRIPTION ENCOUNTER (OUTPATIENT)
Age: 48
End: 2023-10-26

## 2023-10-30 ENCOUNTER — TRANSCRIPTION ENCOUNTER (OUTPATIENT)
Age: 48
End: 2023-10-30

## 2025-01-13 NOTE — BEHAVIORAL HEALTH ASSESSMENT NOTE - SUBSTANCE USE
Chief Complaint   Patient presents with    Left Knee - Post-op     Arthroscopy partial medial meniscectomy, patellofemoral chondroplasty 11/26/2024       History of Present Illness  Patient is a 47-year-old male presenting today for first postop follow-up after left knee arthroscopy partial medial meniscectomy, patellofemoral chondroplasty.  Patient returns today noting minimal pain.  Does state that swelling and bruising has drastically improved.  Still having some discomfort noticeably with walking downstairs however is back to activities with no significant issues.  Has begun gentle transition into weightlifting and walking.  Denies any calf pain or shortness of breath.       Exam  Mild effusion  Healthy incisions - no active drainage  Good range of motion  No calf swelling  Negative Gianna´s test  Distal neurovascular exam intact     Assessment  Patient status post left knee arthroscopy partial medial meniscectomy, patellofemoral chondroplasty, 6 weeks out     Plan  Discussed short and long term implications for the knee.  Discussed analgesics, ice, rest.  Encouraged home exercise program, physical therapy.  Discussed return to activities as well as importance of using pain as a guide  Follow-up in 2 months  XRays at follow up none    Deshaun Navarrete PA-C     Yes

## 2025-09-09 ENCOUNTER — NON-APPOINTMENT (OUTPATIENT)
Age: 50
End: 2025-09-09

## 2025-09-10 ENCOUNTER — APPOINTMENT (OUTPATIENT)
Dept: OTOLARYNGOLOGY | Facility: CLINIC | Age: 50
End: 2025-09-10

## 2025-09-10 VITALS
OXYGEN SATURATION: 98 % | BODY MASS INDEX: 28 KG/M2 | TEMPERATURE: 98 F | SYSTOLIC BLOOD PRESSURE: 126 MMHG | DIASTOLIC BLOOD PRESSURE: 87 MMHG | HEIGHT: 71 IN | WEIGHT: 200 LBS | HEART RATE: 75 BPM

## 2025-09-10 DIAGNOSIS — R49.0 DYSPHONIA: ICD-10-CM

## 2025-09-10 DIAGNOSIS — J04.0 ACUTE LARYNGITIS: ICD-10-CM

## 2025-09-10 DIAGNOSIS — K21.9 ACUTE LARYNGITIS: ICD-10-CM

## 2025-09-10 RX ORDER — OMEPRAZOLE 40 MG/1
40 CAPSULE, DELAYED RELEASE ORAL DAILY
Qty: 30 | Refills: 0 | Status: ACTIVE | COMMUNITY
Start: 2025-09-10 | End: 1900-01-01